# Patient Record
Sex: MALE | Race: WHITE | NOT HISPANIC OR LATINO | Employment: OTHER | URBAN - NONMETROPOLITAN AREA
[De-identification: names, ages, dates, MRNs, and addresses within clinical notes are randomized per-mention and may not be internally consistent; named-entity substitution may affect disease eponyms.]

---

## 2017-02-03 ENCOUNTER — TRANSCRIBE ORDERS (OUTPATIENT)
Dept: ADMINISTRATIVE | Facility: HOSPITAL | Age: 62
End: 2017-02-03

## 2017-02-03 DIAGNOSIS — J98.4 OTHER PULMONARY INSUFFICIENCY, NOT ELSEWHERE CLASSIFIED: Primary | ICD-10-CM

## 2017-03-31 ENCOUNTER — TRANSCRIBE ORDERS (OUTPATIENT)
Dept: ADMINISTRATIVE | Facility: HOSPITAL | Age: 62
End: 2017-03-31

## 2017-03-31 DIAGNOSIS — R27.0 ATAXIA: ICD-10-CM

## 2017-03-31 DIAGNOSIS — M51.16 LUMBAR DISC DISEASE WITH RADICULOPATHY: Primary | ICD-10-CM

## 2017-03-31 DIAGNOSIS — M54.2 NECK PAIN: ICD-10-CM

## 2017-04-05 ENCOUNTER — HOSPITAL ENCOUNTER (OUTPATIENT)
Dept: MRI IMAGING | Facility: HOSPITAL | Age: 62
Discharge: HOME | End: 2017-04-05

## 2017-04-05 ENCOUNTER — HOSPITAL ENCOUNTER (OUTPATIENT)
Dept: MRI IMAGING | Facility: HOSPITAL | Age: 62
Discharge: HOME | End: 2017-04-05
Admitting: NEUROLOGICAL SURGERY

## 2017-04-05 DIAGNOSIS — R27.0 ATAXIA: ICD-10-CM

## 2017-04-05 DIAGNOSIS — M54.2 NECK PAIN: ICD-10-CM

## 2017-04-05 DIAGNOSIS — M51.16 LUMBAR DISC DISEASE WITH RADICULOPATHY: ICD-10-CM

## 2017-04-05 LAB — CREAT BLDA-MCNC: 0.7 MG/DL (ref 0.6–1.3)

## 2017-04-05 PROCEDURE — 72141 MRI NECK SPINE W/O DYE: CPT

## 2017-04-05 PROCEDURE — 82565 ASSAY OF CREATININE: CPT

## 2017-04-05 PROCEDURE — A9577 INJ MULTIHANCE: HCPCS | Performed by: NEUROLOGICAL SURGERY

## 2017-04-05 PROCEDURE — 0 GADOBENATE DIMEGLUMINE 529 MG/ML SOLUTION: Performed by: NEUROLOGICAL SURGERY

## 2017-04-05 PROCEDURE — 72158 MRI LUMBAR SPINE W/O & W/DYE: CPT

## 2017-04-05 RX ADMIN — GADOBENATE DIMEGLUMINE 14 ML: 529 INJECTION, SOLUTION INTRAVENOUS at 10:00

## 2017-05-03 ENCOUNTER — HOSPITAL ENCOUNTER (OUTPATIENT)
Dept: CT IMAGING | Facility: HOSPITAL | Age: 62
Discharge: HOME | End: 2017-05-03
Admitting: NURSE PRACTITIONER

## 2017-05-03 ENCOUNTER — APPOINTMENT (OUTPATIENT)
Dept: CT IMAGING | Facility: HOSPITAL | Age: 62
End: 2017-05-03

## 2017-05-03 DIAGNOSIS — J98.4 OTHER PULMONARY INSUFFICIENCY, NOT ELSEWHERE CLASSIFIED: ICD-10-CM

## 2017-05-03 PROCEDURE — 71250 CT THORAX DX C-: CPT

## 2017-05-10 ENCOUNTER — APPOINTMENT (OUTPATIENT)
Dept: CT IMAGING | Facility: HOSPITAL | Age: 62
End: 2017-05-10

## 2017-09-22 ENCOUNTER — TRANSCRIBE ORDERS (OUTPATIENT)
Dept: ADMINISTRATIVE | Facility: HOSPITAL | Age: 62
End: 2017-09-22

## 2017-09-22 DIAGNOSIS — J98.4 OTHER DISORDERS OF LUNG: Primary | ICD-10-CM

## 2017-11-15 ENCOUNTER — APPOINTMENT (OUTPATIENT)
Dept: CT IMAGING | Facility: HOSPITAL | Age: 62
End: 2017-11-15

## 2017-11-15 ENCOUNTER — HOSPITAL ENCOUNTER (OUTPATIENT)
Dept: CT IMAGING | Facility: HOSPITAL | Age: 62
Discharge: HOME | End: 2017-11-15
Admitting: NURSE PRACTITIONER

## 2017-11-15 DIAGNOSIS — J98.4 OTHER DISORDERS OF LUNG: ICD-10-CM

## 2017-11-15 PROCEDURE — 71250 CT THORAX DX C-: CPT

## 2018-10-15 ENCOUNTER — TRANSCRIBE ORDERS (OUTPATIENT)
Dept: ADMINISTRATIVE | Facility: HOSPITAL | Age: 63
End: 2018-10-15

## 2018-10-15 DIAGNOSIS — J44.9 CHRONIC OBSTRUCTIVE PULMONARY DISEASE, UNSPECIFIED COPD TYPE (HCC): Primary | ICD-10-CM

## 2018-11-06 ENCOUNTER — HOSPITAL ENCOUNTER (OUTPATIENT)
Dept: GENERAL RADIOLOGY | Facility: HOSPITAL | Age: 63
Discharge: HOME | End: 2018-11-06
Admitting: NURSE PRACTITIONER

## 2018-11-06 PROCEDURE — 71046 X-RAY EXAM CHEST 2 VIEWS: CPT

## 2018-11-20 PROBLEM — M54.9 CHRONIC BACK PAIN: Status: ACTIVE | Noted: 2018-11-20

## 2018-11-20 PROBLEM — J43.2 CENTRILOBULAR EMPHYSEMA (HCC): Status: ACTIVE | Noted: 2018-11-20

## 2018-11-20 PROBLEM — J98.4 APICAL LUNG SCARRING: Status: ACTIVE | Noted: 2018-11-20

## 2018-11-20 PROBLEM — G89.29 CHRONIC BACK PAIN: Status: ACTIVE | Noted: 2018-11-20

## 2018-11-20 NOTE — PROGRESS NOTES
ELMER Parson  [facility]   Respiratory Disease Clinic  [address] [place] [address]  Phone: [phone]  Fax: [phone]     Virgil Knight is a 63 y.o. male.   CC:   Chief Complaint   Patient presents with   • Emphysema        HPI: Mr. Knight is coming in for follow-up of lung scarring and COPD.  We did follow multiple lung nodules for quite some time which ultimately developed into scarring.  He sees us on an annual basis with repeat imaging to follow those.  He utilizes Spiriva and Symbicort for maintenance therapy.  He does have chronic issues with his back and poor use of his left arm.  He indicates since I saw him last he did undergo a shoulder replacement on the left.  Weakness, pain and range of motion have significantly improved.  His overall health has been doing well.  He has not been in the hospital.  He feels that his breathing is the same on his maintenance medications.    The following portions of the patient's history were reviewed and updated as appropriate: allergies, current medications, past family history, past medical history, past social history, past surgical history and problem list.    Past Medical History:   Diagnosis Date   • Colon polyps    • Hiatal hernia    • Hypertension    • Seizure disorder (CMS/HCC)    • Stroke (CMS/HCC)        Family History   Problem Relation Age of Onset   • Colon cancer Other        Social History     Socioeconomic History   • Marital status:      Spouse name: Not on file   • Number of children: Not on file   • Years of education: Not on file   • Highest education level: Not on file   Social Needs   • Financial resource strain: Not on file   • Food insecurity - worry: Not on file   • Food insecurity - inability: Not on file   • Transportation needs - medical: Not on file   • Transportation needs - non-medical: Not on file   Occupational History   • Not on file   Tobacco Use   • Smoking status: Current Every Day  Smoker     Packs/day: 0.50     Years: 30.00     Pack years: 15.00     Types: Cigarettes     Start date: 1976   • Smokeless tobacco: Never Used   Substance and Sexual Activity   • Alcohol use: No   • Drug use: No   • Sexual activity: Not on file   Other Topics Concern   • Not on file   Social History Narrative   • Not on file       Review of Systems   Constitutional: Negative for activity change, chills, fatigue and fever.   HENT: Negative for congestion, postnasal drip, rhinorrhea, sinus pressure, sore throat and trouble swallowing.    Eyes: Negative for blurred vision, double vision and pain.   Respiratory: Positive for shortness of breath. Negative for cough, chest tightness and wheezing.    Cardiovascular: Negative for chest pain, palpitations and leg swelling.   Gastrointestinal: Negative for abdominal distention, constipation, diarrhea, nausea and vomiting.   Endocrine: Negative for polydipsia, polyphagia and polyuria.   Genitourinary: Negative for dysuria, frequency and urgency.   Musculoskeletal: Positive for joint swelling. Negative for arthralgias, back pain and gait problem.   Skin: Negative for color change, dry skin, rash and skin lesions.   Allergic/Immunologic: Negative for environmental allergies, food allergies and immunocompromised state.   Neurological: Negative for dizziness, seizures, speech difficulty, weakness, light-headedness, memory problem and confusion.   Hematological: Negative for adenopathy. Does not bruise/bleed easily.   Psychiatric/Behavioral: Negative for sleep disturbance, negative for hyperactivity and depressed mood. The patient is not nervous/anxious.        Vitals:    11/21/18 1124   BP: 132/80   Pulse: 80   Resp: 16   SpO2: 96%       Physical Exam   Constitutional: He is oriented to person, place, and time. He appears well-developed and well-nourished. No distress.   HENT:   Head: Normocephalic and atraumatic.   Right Ear: External ear normal.   Left Ear: External ear normal.    Nose: Nose normal.   Mouth/Throat: Oropharynx is clear and moist. No oropharyngeal exudate.   Eyes: Conjunctivae and EOM are normal. Pupils are equal, round, and reactive to light. Right eye exhibits no discharge. Left eye exhibits no discharge.   Neck: Normal range of motion. Neck supple. No JVD present.   Cardiovascular: Normal rate and regular rhythm.   No murmur heard.  Pulmonary/Chest: Effort normal and breath sounds normal. No respiratory distress. He has no wheezes.   Abdominal: Soft. Bowel sounds are normal. He exhibits no distension. There is no tenderness.   Musculoskeletal: Normal range of motion. He exhibits no edema or deformity.   Neurological: He is alert and oriented to person, place, and time. He displays normal reflexes. No cranial nerve deficit. Coordination normal.   Skin: Skin is warm and dry. No rash noted. He is not diaphoretic. No erythema.   Psychiatric: He has a normal mood and affect. His behavior is normal. Thought content normal.   Nursing note and vitals reviewed.      Pulmonary Functions Testing Results:    FEV1   Date Value Ref Range Status   11/21/2018 25% liters Final     FVC   Date Value Ref Range Status   11/21/2018 51% liters Final     FEV1/FVC   Date Value Ref Range Status   11/21/2018 38.52% % Final     My interpretation of his PFTs reflect very severe obstructive disease with reduced mid flows.  FEV1 is somewhat reduced from previous however the last PFT was in 2013    CXR: My interpretation from CT scan performed on 11-6-18 shows stable centrilobular emphysema, biapical scarring stable        Virgil was seen today for emphysema.    Diagnoses and all orders for this visit:    Need for immunization against influenza  -     Flucelvax Quad=>4Years (2119-0660)    COPD, group C, by GOLD 2017 classification (CMS/[facility])  -     Pulmonary Function Test; Future  -     Pulmonary Function Test  -     XR Chest 2 View; Future  -     budesonide-formoterol (SYMBICORT) 160-4.5 MCG/ACT inhaler;  Inhale 2 puffs 2 (Two) Times a Day for 14 days.  -     tiotropium bromide monohydrate (SPIRIVA RESPIMAT) 2.5 MCG/ACT aerosol solution inhaler; Inhale 2 puffs Daily for 14 days.    Apical lung scarring  -     XR Chest 2 View; Future    Centrilobular emphysema (CMS/HCC)    Chronic back pain, unspecified back location, unspecified back pain laterality      Patient's Body mass index is 20.66 kg/m². BMI is below normal. Let PCP provider.   Patient doing well from a pulmonary standpoint.  Will remain on same bronchodilators.  We will continue with annual chest x-ray imaging to survey his history of lung scarring.     Rubina Hernández, APRN  11/21/2018  12:07 PM    Return in about 1 year (around 11/21/2019) for cxr.

## 2018-11-21 ENCOUNTER — OFFICE VISIT (OUTPATIENT)
Dept: PULMONOLOGY | Facility: CLINIC | Age: 63
End: 2018-11-21

## 2018-11-21 VITALS
HEART RATE: 80 BPM | RESPIRATION RATE: 16 BRPM | HEIGHT: 70 IN | OXYGEN SATURATION: 96 % | BODY MASS INDEX: 20.62 KG/M2 | SYSTOLIC BLOOD PRESSURE: 132 MMHG | DIASTOLIC BLOOD PRESSURE: 80 MMHG | WEIGHT: 144 LBS

## 2018-11-21 DIAGNOSIS — M54.9 CHRONIC BACK PAIN, UNSPECIFIED BACK LOCATION, UNSPECIFIED BACK PAIN LATERALITY: ICD-10-CM

## 2018-11-21 DIAGNOSIS — Z23 NEED FOR IMMUNIZATION AGAINST INFLUENZA: Primary | ICD-10-CM

## 2018-11-21 DIAGNOSIS — J43.2 CENTRILOBULAR EMPHYSEMA (HCC): ICD-10-CM

## 2018-11-21 DIAGNOSIS — J98.4 APICAL LUNG SCARRING: ICD-10-CM

## 2018-11-21 DIAGNOSIS — J44.9 COPD, GROUP C, BY GOLD 2017 CLASSIFICATION (HCC): ICD-10-CM

## 2018-11-21 DIAGNOSIS — G89.29 CHRONIC BACK PAIN, UNSPECIFIED BACK LOCATION, UNSPECIFIED BACK PAIN LATERALITY: ICD-10-CM

## 2018-11-21 LAB
FEV1/FVC: NORMAL %
FEV1: NORMAL LITERS
FVC VOL RESPIRATORY: NORMAL LITERS

## 2018-11-21 PROCEDURE — 99214 OFFICE O/P EST MOD 30 MIN: CPT | Performed by: NURSE PRACTITIONER

## 2018-11-21 PROCEDURE — G0008 ADMIN INFLUENZA VIRUS VAC: HCPCS | Performed by: NURSE PRACTITIONER

## 2018-11-21 PROCEDURE — 90674 CCIIV4 VAC NO PRSV 0.5 ML IM: CPT | Performed by: NURSE PRACTITIONER

## 2018-11-21 PROCEDURE — 94010 BREATHING CAPACITY TEST: CPT | Performed by: NURSE PRACTITIONER

## 2018-11-21 RX ORDER — BUDESONIDE AND FORMOTEROL FUMARATE DIHYDRATE 160; 4.5 UG/1; UG/1
2 AEROSOL RESPIRATORY (INHALATION)
Qty: 2 INHALER | Refills: 0 | COMMUNITY
Start: 2018-11-21 | End: 2018-12-05

## 2018-11-21 RX ORDER — BUDESONIDE AND FORMOTEROL FUMARATE DIHYDRATE 160; 4.5 UG/1; UG/1
2 AEROSOL RESPIRATORY (INHALATION)
COMMUNITY
End: 2019-01-08 | Stop reason: SDUPTHER

## 2019-01-08 RX ORDER — BUDESONIDE AND FORMOTEROL FUMARATE DIHYDRATE 160; 4.5 UG/1; UG/1
AEROSOL RESPIRATORY (INHALATION)
Qty: 1 INHALER | Refills: 11 | Status: SHIPPED | OUTPATIENT
Start: 2019-01-08 | End: 2019-02-20 | Stop reason: SDUPTHER

## 2019-02-20 RX ORDER — BUDESONIDE AND FORMOTEROL FUMARATE DIHYDRATE 160; 4.5 UG/1; UG/1
2 AEROSOL RESPIRATORY (INHALATION) 2 TIMES DAILY
Qty: 1 INHALER | Refills: 0 | COMMUNITY
Start: 2019-02-20 | End: 2020-04-13

## 2019-11-14 PROBLEM — Z87.891 PERSONAL HISTORY OF NICOTINE DEPENDENCE: Status: ACTIVE | Noted: 2019-11-14

## 2019-11-14 PROBLEM — J44.9 COPD, GROUP C, BY GOLD 2017 CLASSIFICATION: Status: ACTIVE | Noted: 2019-11-14

## 2019-11-14 NOTE — PROGRESS NOTES
ELMER Parson  [facility]   Respiratory Disease Clinic  [address] [place] [address]  Phone: [phone]  Fax: [phone]     Virgil Knight is a 64 y.o. male.   CC:   Chief Complaint   Patient presents with   • follow up from chest xray     wants samples, and asking about when he needs to get a pneumonia shot        HPI: Mr. Knight is coming in for management of his lung scarring and COPD.  He experiences moderate persistent shortness of breath occurring in the chest daily for several years.  It is worsened by activity and managed very well with bronchodilators.  He is on Spiriva and Symbicort daily.  He does not require supplemental oxygen.  He has no new complaints since I saw him.  He is requesting a flu shot today.  He is also inquiring about the status of his current see with his pneumonia vaccinations.    The following portions of the patient's history were reviewed and updated as appropriate: allergies, current medications, past family history, past medical history, past social history, past surgical history and problem list.    Past Medical History:   Diagnosis Date   • Colon polyps    • COPD, group C, by GOLD 2017 classification (CMS/HCC) 11/14/2019   • Hiatal hernia    • Hypertension    • Personal history of nicotine dependence 11/14/2019   • Seizure disorder (CMS/[facility])    • Stroke (CMS/[facility])        Prior to Admission medications    Medication Sig Start Date End Date Taking? Authorizing Provider   budesonide-formoterol (SYMBICORT) 160-4.5 MCG/ACT inhaler Inhale 2 puffs 2 (Two) Times a Day. 2/20/19   Rubina Hernández APRN   Cholecalciferol (VITAMIN D3) 34870 UNITS tablet Take  by mouth.    Provider, MD Ava   citalopram (CeleXA) 20 MG tablet Take 20 mg by mouth daily.    Provider, MD Ava   clopidogrel (PLAVIX) 75 MG tablet Take 75 mg by mouth daily.    ProviderAva MD   divalproex (DEPAKOTE) 500 MG DR tablet Take 500 mg by mouth daily.     ProviderAva MD   lamoTRIgine (LaMICtal) 100 MG tablet Take 200 mg by mouth daily.    Ava Floyd MD   lisinopril (PRINIVIL,ZESTRIL) 10 MG tablet Take 10 mg by mouth daily.    Ava Floyd MD   metoprolol tartrate (LOPRESSOR) 25 MG tablet Take 25 mg by mouth daily. 1/2    Ava Floyd MD   pantoprazole (PROTONIX) 40 MG EC tablet Take 40 mg by mouth daily.    Ava Floyd MD   Simethicone 80 MG tablet Take  by mouth daily.    Ava Floyd MD   tiotropium bromide monohydrate (SPIRIVA RESPIMAT) 2.5 MCG/ACT aerosol solution inhaler Inhale 2 puffs Daily. 2/20/19   Rubina Hernández APRN       Family History   Problem Relation Age of Onset   • Colon cancer Other        Social History     Socioeconomic History   • Marital status:      Spouse name: Not on file   • Number of children: Not on file   • Years of education: Not on file   • Highest education level: Not on file   Tobacco Use   • Smoking status: Current Every Day Smoker     Packs/day: 0.50     Years: 30.00     Pack years: 15.00     Types: Cigarettes     Start date: 1976   • Smokeless tobacco: Never Used   Substance and Sexual Activity   • Alcohol use: No   • Drug use: No       Review of Systems   Constitutional: Negative for activity change, chills, fatigue and fever.   HENT: Negative for congestion, postnasal drip, rhinorrhea, sinus pressure, sore throat and trouble swallowing.    Eyes: Negative for blurred vision, double vision and pain.   Respiratory: Positive for shortness of breath. Negative for cough, chest tightness and wheezing.    Cardiovascular: Negative for chest pain, palpitations and leg swelling.   Gastrointestinal: Negative for abdominal distention, constipation, diarrhea, nausea and vomiting.   Endocrine: Negative for polydipsia, polyphagia and polyuria.   Genitourinary: Negative for dysuria, frequency and urgency.   Musculoskeletal: Positive for back pain and gait problem. Negative for  "arthralgias and joint swelling.   Skin: Negative for color change, dry skin, rash and skin lesions.   Allergic/Immunologic: Negative for environmental allergies, food allergies and immunocompromised state.   Neurological: Positive for numbness. Negative for dizziness, seizures, speech difficulty, weakness, light-headedness, memory problem and confusion.   Hematological: Negative for adenopathy. Does not bruise/bleed easily.   Psychiatric/Behavioral: Negative for sleep disturbance, negative for hyperactivity and depressed mood. The patient is not nervous/anxious.        /80   Pulse 55   Ht 177.8 cm (70\")   Wt 64 kg (141 lb)   SpO2 98% Comment: RA  BMI 20.23 kg/m²     Physical Exam   Constitutional: He is oriented to person, place, and time. He appears well-developed and well-nourished. He appears cachectic. No distress.   HENT:   Head: Normocephalic and atraumatic.   Right Ear: External ear normal.   Left Ear: External ear normal.   Nose: Nose normal.   Mouth/Throat: Oropharynx is clear and moist. No oropharyngeal exudate.   Eyes: Conjunctivae and EOM are normal. Pupils are equal, round, and reactive to light. Right eye exhibits no discharge. Left eye exhibits no discharge.   Neck: Normal range of motion. Neck supple. No JVD present.   Cardiovascular: Normal rate and regular rhythm.   No murmur heard.  Pulmonary/Chest: Effort normal and breath sounds normal. No respiratory distress. He has no wheezes.   Abdominal: Soft. Bowel sounds are normal. He exhibits no distension. There is no tenderness.   Musculoskeletal: Normal range of motion. He exhibits no edema or deformity.   Neurological: He is alert and oriented to person, place, and time. He displays normal reflexes. No cranial nerve deficit. Coordination normal.   Shuffling gait, chronic   Skin: Skin is warm and dry. No rash noted. He is not diaphoretic. No erythema.   Psychiatric: He has a normal mood and affect. His behavior is normal. Thought content " normal.   Nursing note and vitals reviewed.      Pulmonary Functions Testing Results:    FEV1   Date Value Ref Range Status   11/21/2018 25% liters Final     Results for orders placed in visit on 11/21/18   Pulmonary Function Test     No notes on file    No PFTs for this visit.  Last FEV1 on file from 1 year ago was 25    CXR: My interpretation of chest x-[facility] at [facility] from this morning shows moderate biapical emphysematous changes otherwise nothing acute        Problem List Items Addressed This Visit        Respiratory    Apical lung scarring - Primary    Relevant Medications    Aclidinium Br-Formoterol Fum 400-12 MCG/ACT aerosol powder     Other Relevant Orders    XR Chest 2 View    Centrilobular emphysema (CMS/[facility])    Relevant Medications    Aclidinium Br-Formoterol Fum 400-12 MCG/ACT aerosol powder     COPD, group C, by GOLD 2017 classification (CMS/[facility])    Relevant Medications    Aclidinium Br-Formoterol Fum 400-12 MCG/ACT aerosol powder     Other Relevant Orders    XR Chest 2 View       Other    Personal history of nicotine dependence      Other Visit Diagnoses     Encounter for immunization        Need for immunization against influenza        Relevant Orders    Fluarix/Fluzone/Afluria Quad>6 Months (Completed)        Patient's Body mass index is 20.23 kg/m². BMI is above normal parameters. Recommendations include: educational material.      Assessment/Plan         He is stable from pulmonary standpoint.  Chest x-ray remained stable.  Flu shot was provided to him today.  He has had 1 of his Pneumovax shots.  He will need to other after the age of 66.  He is due for Prevnar 13 which we do not have available here.  He plans to obtain that at his [facility] pharmacy.  His primary care provider has left the area.  He will need to establish with a new primary care provider.  Requesting annual follow-up.  He will call sooner if needed.    Rubina Hernández, APRN  11/20/2019  1:41 PM    Return in about 1 year (around  11/20/2020).

## 2019-11-20 ENCOUNTER — HOSPITAL ENCOUNTER (OUTPATIENT)
Dept: GENERAL RADIOLOGY | Facility: HOSPITAL | Age: 64
Discharge: HOME | End: 2019-11-20
Admitting: NURSE PRACTITIONER

## 2019-11-20 ENCOUNTER — OFFICE VISIT (OUTPATIENT)
Dept: PULMONOLOGY | Facility: CLINIC | Age: 64
End: 2019-11-20

## 2019-11-20 VITALS
OXYGEN SATURATION: 98 % | HEIGHT: 70 IN | HEART RATE: 55 BPM | SYSTOLIC BLOOD PRESSURE: 150 MMHG | DIASTOLIC BLOOD PRESSURE: 80 MMHG | BODY MASS INDEX: 20.19 KG/M2 | WEIGHT: 141 LBS

## 2019-11-20 DIAGNOSIS — Z87.891 PERSONAL HISTORY OF NICOTINE DEPENDENCE: ICD-10-CM

## 2019-11-20 DIAGNOSIS — J98.4 APICAL LUNG SCARRING: ICD-10-CM

## 2019-11-20 DIAGNOSIS — Z23 NEED FOR IMMUNIZATION AGAINST INFLUENZA: ICD-10-CM

## 2019-11-20 DIAGNOSIS — J44.9 COPD, GROUP C, BY GOLD 2017 CLASSIFICATION (HCC): ICD-10-CM

## 2019-11-20 DIAGNOSIS — J98.4 APICAL LUNG SCARRING: Primary | ICD-10-CM

## 2019-11-20 DIAGNOSIS — Z23 ENCOUNTER FOR IMMUNIZATION: ICD-10-CM

## 2019-11-20 DIAGNOSIS — J43.2 CENTRILOBULAR EMPHYSEMA (HCC): ICD-10-CM

## 2019-11-20 PROCEDURE — 90686 IIV4 VACC NO PRSV 0.5 ML IM: CPT | Performed by: NURSE PRACTITIONER

## 2019-11-20 PROCEDURE — 99214 OFFICE O/P EST MOD 30 MIN: CPT | Performed by: NURSE PRACTITIONER

## 2019-11-20 PROCEDURE — G0008 ADMIN INFLUENZA VIRUS VAC: HCPCS | Performed by: NURSE PRACTITIONER

## 2019-11-20 PROCEDURE — 71046 X-RAY EXAM CHEST 2 VIEWS: CPT

## 2019-11-20 RX ORDER — ASPIRIN/CALCIUM/MAG/ALUMINUM 325 MG
325 TABLET ORAL
COMMUNITY
Start: 2019-06-08

## 2019-11-20 RX ORDER — AMLODIPINE BESYLATE 10 MG/1
10 TABLET ORAL
COMMUNITY

## 2019-12-26 ENCOUNTER — TELEPHONE (OUTPATIENT)
Dept: PULMONOLOGY | Facility: CLINIC | Age: 64
End: 2019-12-26

## 2019-12-26 NOTE — TELEPHONE ENCOUNTER
He does not think the new inhaler worked good but the symbicort does well for him.   He wants to know if he can go back on spiriva and symbicort or can he take the new inhaler and symbicort together.

## 2020-04-13 RX ORDER — BUDESONIDE AND FORMOTEROL FUMARATE DIHYDRATE 160; 4.5 UG/1; UG/1
AEROSOL RESPIRATORY (INHALATION)
Qty: 33 G | Refills: 3 | Status: SHIPPED | OUTPATIENT
Start: 2020-04-13 | End: 2020-11-25

## 2020-05-14 ENCOUNTER — OFFICE VISIT (OUTPATIENT)
Dept: GASTROENTEROLOGY | Facility: CLINIC | Age: 65
End: 2020-05-14

## 2020-05-14 VITALS
HEART RATE: 80 BPM | BODY MASS INDEX: 19.32 KG/M2 | TEMPERATURE: 92.3 F | HEIGHT: 71 IN | DIASTOLIC BLOOD PRESSURE: 68 MMHG | WEIGHT: 138 LBS | SYSTOLIC BLOOD PRESSURE: 118 MMHG | RESPIRATION RATE: 16 BRPM

## 2020-05-14 DIAGNOSIS — R10.13 ABDOMINAL PAIN, EPIGASTRIC: Primary | ICD-10-CM

## 2020-05-14 PROCEDURE — 99213 OFFICE O/P EST LOW 20 MIN: CPT | Performed by: CLINICAL NURSE SPECIALIST

## 2020-05-14 NOTE — PROGRESS NOTES
Virgil Knight  1955 5/14/2020  Chief Complaint   Patient presents with   • GI Problem     Abdominal pain and bloating     Subjective   HPI  Virgil Knight is a 65 y.o. male who presents with a complaint of epigastric abdominal pain over the past month that is constant. He says that his stomach never feels right. He has fullness and bloating associated. It radiates through to his back. He describes it as a pressure. He rates it a 7 out of 10. No fever or chills.  Eating does bother it and worse after meals. He does still have his gallbladder. He has a good appetite. He has only lost a few pounds since last year. No melena. No BRBPR. No fever chills or sweats. He has had a colonoscopy in 2016 reviewed today.  He has a hx of COPD. He is fatigued and overall does not feel well.   Past Medical History:   Diagnosis Date   • Colon polyps    • COPD, group C, by GOLD 2017 classification (CMS/[facility]) 11/14/2019   • Hiatal hernia    • Hypertension    • Personal history of nicotine dependence 11/14/2019   • Seizure disorder (CMS/[facility])    • Stroke (CMS/[facility])      Past Surgical History:   Procedure Laterality Date   • COLONOSCOPY  04/22/2011    ADENOMATOUS/HYPERPLASTIC POLYP   • COLONOSCOPY N/A 12/12/2016    Hyperplastic polyp ascending colon, Diverticulosis repeat exam in 5 years   • ENDOSCOPY  09/05/2014       • HIP FRACTURE SURGERY         Outpatient Medications Marked as Taking for the 5/14/20 encounter (Office Visit) with Annie Klein APRN   Medication Sig Dispense Refill   • amLODIPine (NORVASC) 10 MG tablet Take 10 mg by mouth.     • Aspirin Buf,[id number]-[id number]-FmJcv, (ASCRIPTIN) 325 MG tablet Take 325 mg by mouth.     • budesonide-formoterol (SYMBICORT) 160-4.5 MCG/ACT inhaler Inhale 2 puffs by mouth twice daily 33 g 3   • citalopram (CeleXA) 20 MG tablet Take 20 mg by mouth daily.     • divalproex (DEPAKOTE) 500 MG DR tablet Take 500 mg by mouth daily.     • lamoTRIgine (LaMICtal) 100 MG tablet Take 200  mg by mouth daily.     • lisinopril (PRINIVIL,ZESTRIL) 10 MG tablet Take 10 mg by mouth daily.     • metoprolol tartrate (LOPRESSOR) 25 MG tablet Take 25 mg by mouth daily. 1/2     • pantoprazole (PROTONIX) 40 MG EC tablet Take 40 mg by mouth daily.     • Simethicone 80 MG tablet Take  by mouth daily.     • tiotropium bromide monohydrate (Spiriva Respimat) 2.5 MCG/ACT aerosol solution inhaler Inhale 2 puffs Daily. 1 inhaler 11     Allergies   Allergen Reactions   • Amoxicillin-Pot Clavulanate Rash   • Codeine Nausea And Vomiting     Social History     Socioeconomic History   • Marital status:      Spouse name: Not on file   • Number of children: Not on file   • Years of education: Not on file   • Highest education level: Not on file   Tobacco Use   • Smoking status: Current Every Day Smoker     Packs/day: 0.50     Years: 30.00     Pack years: 15.00     Types: Cigarettes     Start date: 1976   • Smokeless tobacco: Never Used   Substance and Sexual Activity   • Alcohol use: No   • Drug use: No     Family History   Problem Relation Age of Onset   • Colon cancer Other    • Colon polyps Other      Health Maintenance   Topic Date Due   • TDAP/TD VACCINES (1 - Tdap) 02/19/1967   • PNEUMOCOCCAL VACCINE (19-64 MEDIUM RISK) (1 of 1 - PPSV23) 02/19/1975   • ZOSTER VACCINE (1 of 2) 02/19/2006   • HEPATITIS C SCREENING  11/14/2018   • MEDICARE ANNUAL WELLNESS  11/14/2018   • INFLUENZA VACCINE  08/01/2020   • COLONOSCOPY  12/12/2026     Review of Systems   Constitutional: Negative for activity change, appetite change, chills, diaphoresis, fatigue, fever and unexpected weight change.   HENT: Negative for ear pain, hearing loss, mouth sores, sore throat, trouble swallowing and voice change.    Eyes: Negative.    Respiratory: Negative for cough, choking, shortness of breath and wheezing.    Cardiovascular: Negative for chest pain and palpitations.   Gastrointestinal: Positive for abdominal pain. Negative for blood in stool,  "constipation, diarrhea, nausea and vomiting.   Endocrine: Negative for cold intolerance and heat intolerance.   Genitourinary: Negative for decreased urine volume, dysuria, frequency, hematuria and urgency.   Musculoskeletal: Negative for back pain, gait problem and myalgias.   Skin: Negative for color change, pallor and rash.   Allergic/Immunologic: Negative for food allergies and immunocompromised state.   Neurological: Negative for dizziness, tremors, seizures, syncope, weakness, light-headedness, numbness and headaches.   Hematological: Negative for adenopathy. Does not bruise/bleed easily.   Psychiatric/Behavioral: Negative for agitation and confusion. The patient is not nervous/anxious.    All other systems reviewed and are negative.    Objective   Vitals:    05/14/20 1406   BP: 118/68   Pulse: 80   Resp: 16   Temp: 92.3 °F (33.5 °C)   Weight: 62.6 kg (138 lb)   Height: 180.3 cm (71\")     Body mass index is 19.25 kg/m².  Physical Exam   Constitutional: He is oriented to person, place, and time. He appears well-developed and well-nourished.   Thin appearing    HENT:   Head: Normocephalic and atraumatic.   Eyes: Pupils are equal, round, and reactive to light.   Neck: Normal range of motion. Neck supple. No tracheal deviation present.   Cardiovascular: Normal rate, regular rhythm and normal heart sounds. Exam reveals no gallop and no friction rub.   No murmur heard.  Pulmonary/Chest: Effort normal and breath sounds normal. No respiratory distress. He has no wheezes. He has no rales. He exhibits no tenderness.   Abdominal: Soft. Bowel sounds are normal. He exhibits no distension. There is no hepatosplenomegaly. There is tenderness. There is no rigidity, no rebound and no guarding.   Musculoskeletal: Normal range of motion. He exhibits no edema, tenderness or deformity.   Neurological: He is alert and oriented to person, place, and time. He has normal reflexes.   Skin: Skin is warm and dry. No rash noted. No " pallor.   Psychiatric: He has a normal mood and affect. His behavior is normal. Judgment and thought content normal.     Assessment/Plan   Virgil was seen today for gi problem.    Diagnoses and all orders for this visit:    Abdominal pain, epigastric  -     CT Abdomen Pelvis With Contrast; Future        Part of this note may be an electronic transcription/translation of spoken language to printed text using the Dragon Dictation System.  Body mass index is 19.25 kg/m².  Return in about 2 weeks (around 5/28/2020).    Patient's Body mass index is 19.25 kg/m². BMI is within normal parameters. No follow-up required..      All risks, benefits, alternatives, and indications of colonoscopy and/or Endoscopy procedure have been discussed with the patient. Risks to include perforation of the colon requiring possible surgery or colostomy, risk of bleeding from biopsies or removal of colon tissue, possibility of missing a colon polyp or cancer, or adverse drug reaction.  Benefits to include the diagnosis and management of disease of the colon and rectum. Alternatives to include barium enema, radiographic evaluation, lab testing or no intervention. Pt verbalizes understanding and agrees.     Annie Klein, APRN  5/14/2020  14:34      Obesity, Adult  Obesity is the condition of having too much total body fat. Being overweight or obese means that your weight is greater than what is considered healthy for your body size. Obesity is determined by a measurement called BMI. BMI is an estimate of body fat and is calculated from height and weight. For adults, a BMI of 30 or higher is considered obese.  Obesity can eventually lead to other health concerns and major illnesses, including:  · Stroke.  · Coronary artery disease (CAD).  · Type 2 diabetes.  · Some types of cancer, including cancers of the colon, breast, uterus, and gallbladder.  · Osteoarthritis.  · High blood pressure (hypertension).  · High cholesterol.  · Sleep  apnea.  · Gallbladder stones.  · Infertility problems.  What are the causes?  The main cause of obesity is taking in (consuming) more calories than your body uses for energy. Other factors that contribute to this condition may include:  · Being born with genes that make you more likely to become obese.  · Having a medical condition that causes obesity. These conditions include:  ¨ Hypothyroidism.  ¨ Polycystic ovarian syndrome (PCOS).  ¨ Binge-eating disorder.  ¨ Cushing syndrome.  · Taking certain medicines, such as steroids, antidepressants, and seizure medicines.  · Not being physically active (sedentary lifestyle).  · Living where there are limited places to exercise safely or buy healthy foods.  · Not getting enough sleep.  What increases the risk?  The following factors may increase your risk of this condition:  · Having a family history of obesity.  · Being a woman of -American descent.  · Being a man of  descent.  What are the signs or symptoms?  Having excessive body fat is the main symptom of this condition.  How is this diagnosed?  This condition may be diagnosed based on:  · Your symptoms.  · Your medical history.  · A physical exam. Your health care provider may measure:  ¨ Your BMI. If you are an adult with a BMI between 25 and less than 30, you are considered overweight. If you are an adult with a BMI of 30 or higher, you are considered obese.  ¨ The distances around your hips and your waist (circumferences). These may be compared to each other to help diagnose your condition.  ¨ Your skinfold thickness. Your health care provider may gently pinch a fold of your skin and measure it.  How is this treated?  Treatment for this condition often includes changing your lifestyle. Treatment may include some or all of the following:  · Dietary changes. Work with your health care provider and a dietitian to set a weight-loss goal that is healthy and reasonable for you. Dietary changes may include  eating:  ¨ Smaller portions. A portion size is the amount of a particular food that is healthy for you to eat at one time. This varies from person to person.  ¨ Low-calorie or low-fat options.  ¨ More whole grains, fruits, and vegetables.  · Regular physical activity. This may include aerobic activity (cardio) and strength training.  · Medicine to help you lose weight. Your health care provider may prescribe medicine if you are unable to lose 1 pound a week after 6 weeks of eating more healthily and doing more physical activity.  · Surgery. Surgical options may include gastric banding and gastric bypass. Surgery may be done if:  ¨ Other treatments have not helped to improve your condition.  ¨ You have a BMI of 40 or higher.  ¨ You have life-threatening health problems related to obesity.  Follow these instructions at home:     Eating and drinking     · Follow recommendations from your health care provider about what you eat and drink. Your health care provider may advise you to:  ¨ Limit fast foods, sweets, and processed snack foods.  ¨ Choose low-fat options, such as low-fat milk instead of whole milk.  ¨ Eat 5 or more servings of fruits or vegetables every day.  ¨ Eat at home more often. This gives you more control over what you eat.  ¨ Choose healthy foods when you eat out.  ¨ Learn what a healthy portion size is.  ¨ Keep low-fat snacks on hand.  ¨ Avoid sugary drinks, such as soda, fruit juice, iced tea sweetened with sugar, and flavored milk.  ¨ Eat a healthy breakfast.  · Drink enough water to keep your urine clear or pale yellow.  · Do not go without eating for long periods of time (do not fast) or follow a fad diet. Fasting and fad diets can be unhealthy and even dangerous.  Physical Activity   · Exercise regularly, as told by your health care provider. Ask your health care provider what types of exercise are safe for you and how often you should exercise.  · Warm up and stretch before being active.  · Cool  down and stretch after being active.  · Rest between periods of activity.  Lifestyle   · Limit the time that you spend in front of your TV, computer, or video game system.  · Find ways to reward yourself that do not involve food.  · Limit alcohol intake to no more than 1 drink a day for nonpregnant women and 2 drinks a day for men. One drink equals 12 oz of beer, 5 oz of wine, or 1½ oz of hard liquor.  General instructions   · Keep a weight loss journal to keep track of the food you eat and how much you exercise you get.  · Take over-the-counter and prescription medicines only as told by your health care provider.  · Take vitamins and supplements only as told by your health care provider.  · Consider joining a support group. Your health care provider may be able to recommend a support group.  · Keep all follow-up visits as told by your health care provider. This is important.  Contact a health care provider if:  · You are unable to meet your weight loss goal after 6 weeks of dietary and lifestyle changes.  This information is not intended to replace advice given to you by your health care provider. Make sure you discuss any questions you have with your health care provider.  Document Released: 01/25/2006 Document Revised: 05/22/2017 Document Reviewed: 10/05/2016  [employer] Interactive Patient Education © 2017 [employer]      If you smoke or use tobacco, 4 minutes reading provided  Steps to Quit Smoking  Smoking tobacco can be harmful to your health and can affect almost every organ in your body. Smoking puts you, and those around you, at risk for developing many serious chronic diseases. Quitting smoking is difficult, but it is one of the best things that you can do for your health. It is never too late to quit.  What are the benefits of quitting smoking?  When you quit smoking, you lower your risk of developing serious diseases and conditions, such as:  · Lung cancer or lung disease, such as COPD.  · Heart  disease.  · Stroke.  · Heart attack.  · Infertility.  · Osteoporosis and bone fractures.  Additionally, symptoms such as coughing, wheezing, and shortness of breath may get better when you quit. You may also find that you get sick less often because your body is stronger at fighting off colds and infections. If you are pregnant, quitting smoking can help to reduce your chances of having a baby of low birth weight.  How do I get ready to quit?  When you decide to quit smoking, create a plan to make sure that you are successful. Before you quit:  · Pick a date to quit. Set a date within the next two weeks to give you time to prepare.  · Write down the reasons why you are quitting. Keep this list in places where you will see it often, such as on your bathroom mirror or in your car or wallet.  · Identify the people, places, things, and activities that make you want to smoke (triggers) and avoid them. Make sure to take these actions:  ¨ Throw away all cigarettes at home, at work, and in your car.  ¨ Throw away smoking accessories, such as ashtrays and lighters.  ¨ Clean your car and make sure to empty the ashtray.  ¨ Clean your home, including curtains and carpets.  · Tell your family, friends, and coworkers that you are quitting. Support from your loved ones can make quitting easier.  · Talk with your health care provider about your options for quitting smoking.  · Find out what treatment options are covered by your health insurance.  What strategies can I use to quit smoking?  Talk with your healthcare provider about different strategies to quit smoking. Some strategies include:  · Quitting smoking altogether instead of gradually lessening how much you smoke over a period of time. Research shows that quitting “cold turkey” is more successful than gradually quitting.  · Attending in-person counseling to help you build problem-solving skills. You are more likely to have success in quitting if you attend several  counseling sessions. Even short sessions of 10 minutes can be effective.  · Finding resources and support systems that can help you to quit smoking and remain smoke-free after you quit. These resources are most helpful when you use them often. They can include:  ¨ Online chats with a counselor.  ¨ Telephone quitlines.  ¨ Printed self-help materials.  ¨ Support groups or group counseling.  ¨ Text messaging programs.  ¨ Mobile phone applications.  · Taking medicines to help you quit smoking. (If you are pregnant or breastfeeding, talk with your health care provider first.) Some medicines contain nicotine and some do not. Both types of medicines help with cravings, but the medicines that include nicotine help to relieve withdrawal symptoms. Your health care provider may recommend:  ¨ Nicotine patches, gum, or lozenges.  ¨ Nicotine inhalers or sprays.  ¨ Non-nicotine medicine that is taken by mouth.  Talk with your health care provider about combining strategies, such as taking medicines while you are also receiving in-person counseling. Using these two strategies together makes you more likely to succeed in quitting than if you used either strategy on its own.  If you are pregnant or breastfeeding, talk with your health care provider about finding counseling or other support strategies to quit smoking. Do not take medicine to help you quit smoking unless told to do so by your health care provider.  What things can I do to make it easier to quit?  Quitting smoking might feel overwhelming at first, but there is a lot that you can do to make it easier. Take these important actions:  · Reach out to your family and friends and ask that they support and encourage you during this time. Call telephone quitlines, reach out to support groups, or work with a counselor for support.  · Ask people who smoke to avoid smoking around you.  · Avoid places that trigger you to smoke, such as bars, parties, or smoke-break areas at  work.  · Spend time around people who do not smoke.  · Lessen stress in your life, because stress can be a smoking trigger for some people. To lessen stress, try:  ¨ Exercising regularly.  ¨ Deep-breathing exercises.  ¨ Yoga.  ¨ Meditating.  ¨ Performing a body scan. This involves closing your eyes, scanning your body from head to toe, and noticing which parts of your body are particularly tense. Purposefully relax the muscles in those areas.  · Download or purchase mobile phone or tablet apps (applications) that can help you stick to your quit plan by providing reminders, tips, and encouragement. There are many free apps, such as QuitGuide from the CDC (Centers for Disease Control and Prevention). You can find other support for quitting smoking (smoking cessation) through smokefree.ChoicePass and other websites.  How will I feel when I quit smoking?  Within the first 24 hours of quitting smoking, you may start to feel some withdrawal symptoms. These symptoms are usually most noticeable 2-3 days after quitting, but they usually do not last beyond 2-3 weeks. Changes or symptoms that you might experience include:  · Mood swings.  · Restlessness, anxiety, or irritation.  · Difficulty concentrating.  · Dizziness.  · Strong cravings for sugary foods in addition to nicotine.  · Mild weight gain.  · Constipation.  · Nausea.  · Coughing or a sore throat.  · Changes in how your medicines work in your body.  · A depressed mood.  · Difficulty sleeping (insomnia).  After the first 2-3 weeks of quitting, you may start to notice more positive results, such as:  · Improved sense of smell and taste.  · Decreased coughing and sore throat.  · Slower heart rate.  · Lower blood pressure.  · Clearer skin.  · The ability to breathe more easily.  · Fewer sick days.  Quitting smoking is very challenging for most people. Do not get discouraged if you are not successful the first time. Some people need to make many attempts to quit before they  achieve long-term success. Do your best to stick to your quit plan, and talk with your health care provider if you have any questions or concerns.  This information is not intended to replace advice given to you by your health care provider. Make sure you discuss any questions you have with your health care provider.  Document Released: 12/12/2002 Document Revised: 08/15/2017 Document Reviewed: 05/03/2016  [employer] Interactive Patient Education © 2017 [employer]

## 2020-05-20 ENCOUNTER — HOSPITAL ENCOUNTER (OUTPATIENT)
Dept: CT IMAGING | Facility: HOSPITAL | Age: 65
Discharge: HOME | End: 2020-05-20
Admitting: CLINICAL NURSE SPECIALIST

## 2020-05-20 ENCOUNTER — APPOINTMENT (OUTPATIENT)
Dept: CT IMAGING | Facility: HOSPITAL | Age: 65
End: 2020-05-20

## 2020-05-20 DIAGNOSIS — R10.13 ABDOMINAL PAIN, EPIGASTRIC: ICD-10-CM

## 2020-05-20 LAB — CREAT BLDA-MCNC: 0.7 MG/DL (ref 0.6–1.3)

## 2020-05-20 PROCEDURE — 82565 ASSAY OF CREATININE: CPT

## 2020-05-20 PROCEDURE — 74177 CT ABD & PELVIS W/CONTRAST: CPT

## 2020-05-20 PROCEDURE — 25010000002 IOPAMIDOL 61 % SOLUTION: Performed by: CLINICAL NURSE SPECIALIST

## 2020-05-20 RX ADMIN — IOPAMIDOL 100 ML: 612 INJECTION, SOLUTION INTRAVENOUS at 11:40

## 2020-05-20 RX ADMIN — IOPAMIDOL 50 ML: 612 INJECTION, SOLUTION INTRAVENOUS at 11:40

## 2020-05-26 ENCOUNTER — TELEPHONE (OUTPATIENT)
Dept: VASCULAR SURGERY | Facility: CLINIC | Age: 65
End: 2020-05-26

## 2020-05-26 ENCOUNTER — TELEPHONE (OUTPATIENT)
Dept: PODIATRY | Facility: CLINIC | Age: 65
End: 2020-05-26

## 2020-05-26 ENCOUNTER — TELEPHONE (OUTPATIENT)
Dept: GASTROENTEROLOGY | Facility: CLINIC | Age: 65
End: 2020-05-26

## 2020-05-26 DIAGNOSIS — R10.13 ABDOMINAL PAIN, EPIGASTRIC: ICD-10-CM

## 2020-05-26 DIAGNOSIS — R93.5 ABNORMAL CT OF THE ABDOMEN: Primary | ICD-10-CM

## 2020-05-26 NOTE — TELEPHONE ENCOUNTER
Pt called requesting to be seen sooner than June. I told pt wife we would call back and f/u with her. DN

## 2020-05-26 NOTE — TELEPHONE ENCOUNTER
Spoke with patient and advised of upcoming appointment with Dr. Ott. Advised of visitation and mask policy.  Patient expressed understanding for all that was discussed.

## 2020-05-26 NOTE — TELEPHONE ENCOUNTER
Pts spouse called to see if pt needed to keep his appointment with you on 5/28 since we are scheduling an endo, liver u/s and appointment with Dr. Ott. Should we move it to after those procedures have been completed?

## 2020-05-28 PROBLEM — R10.13 ABDOMINAL PAIN, EPIGASTRIC: Status: ACTIVE | Noted: 2020-05-28

## 2020-05-29 ENCOUNTER — APPOINTMENT (OUTPATIENT)
Dept: ULTRASOUND IMAGING | Facility: HOSPITAL | Age: 65
End: 2020-05-29

## 2020-05-29 ENCOUNTER — HOSPITAL ENCOUNTER (OUTPATIENT)
Dept: ULTRASOUND IMAGING | Facility: HOSPITAL | Age: 65
Discharge: HOME | End: 2020-05-29
Admitting: CLINICAL NURSE SPECIALIST

## 2020-05-29 DIAGNOSIS — R93.5 ABNORMAL CT OF THE ABDOMEN: ICD-10-CM

## 2020-05-29 PROCEDURE — 76705 ECHO EXAM OF ABDOMEN: CPT

## 2020-06-01 ENCOUNTER — TRANSCRIBE ORDERS (OUTPATIENT)
Dept: ADMINISTRATIVE | Facility: HOSPITAL | Age: 65
End: 2020-06-01

## 2020-06-01 DIAGNOSIS — Z01.818 PRE-OP TESTING: Primary | ICD-10-CM

## 2020-06-02 ENCOUNTER — LAB (OUTPATIENT)
Dept: LAB | Facility: HOSPITAL | Age: 65
End: 2020-06-02

## 2020-06-02 PROCEDURE — U0003 INFECTIOUS AGENT DETECTION BY NUCLEIC ACID (DNA OR RNA); SEVERE ACUTE RESPIRATORY SYNDROME CORONAVIRUS 2 (SARS-COV-2) (CORONAVIRUS DISEASE [COVID-19]), AMPLIFIED PROBE TECHNIQUE, MAKING USE OF HIGH THROUGHPUT TECHNOLOGIES AS DESCRIBED BY CMS-2020-01-R: HCPCS | Performed by: INTERNAL MEDICINE

## 2020-06-03 LAB
COVID LABCORP PRIORITY: NORMAL
SARS-COV-2 RNA RESP QL NAA+PROBE: NOT DETECTED

## 2020-06-05 ENCOUNTER — ANESTHESIA (OUTPATIENT)
Dept: GASTROENTEROLOGY | Facility: HOSPITAL | Age: 65
End: 2020-06-05

## 2020-06-05 ENCOUNTER — ANESTHESIA EVENT (OUTPATIENT)
Dept: GASTROENTEROLOGY | Facility: HOSPITAL | Age: 65
End: 2020-06-05

## 2020-06-05 ENCOUNTER — HOSPITAL ENCOUNTER (OUTPATIENT)
Facility: HOSPITAL | Age: 65
Setting detail: HOSPITAL OUTPATIENT SURGERY
Discharge: HOME | End: 2020-06-05
Attending: INTERNAL MEDICINE | Admitting: INTERNAL MEDICINE

## 2020-06-05 VITALS
RESPIRATION RATE: 20 BRPM | DIASTOLIC BLOOD PRESSURE: 77 MMHG | SYSTOLIC BLOOD PRESSURE: 124 MMHG | BODY MASS INDEX: 19.04 KG/M2 | HEIGHT: 71 IN | HEART RATE: 67 BPM | OXYGEN SATURATION: 96 % | WEIGHT: 136 LBS | TEMPERATURE: 97.2 F

## 2020-06-05 DIAGNOSIS — R10.13 ABDOMINAL PAIN, EPIGASTRIC: ICD-10-CM

## 2020-06-05 PROCEDURE — 88305 TISSUE EXAM BY PATHOLOGIST: CPT | Performed by: INTERNAL MEDICINE

## 2020-06-05 PROCEDURE — 43239 EGD BIOPSY SINGLE/MULTIPLE: CPT | Performed by: INTERNAL MEDICINE

## 2020-06-05 PROCEDURE — 25010000002 PROPOFOL 10 MG/ML EMULSION: Performed by: NURSE ANESTHETIST, CERTIFIED REGISTERED

## 2020-06-05 RX ORDER — SODIUM CHLORIDE 9 MG/ML
500 INJECTION, SOLUTION INTRAVENOUS CONTINUOUS PRN
Status: DISCONTINUED | OUTPATIENT
Start: 2020-06-05 | End: 2020-06-05 | Stop reason: HOSPADM

## 2020-06-05 RX ORDER — PROPOFOL 10 MG/ML
VIAL (ML) INTRAVENOUS AS NEEDED
Status: DISCONTINUED | OUTPATIENT
Start: 2020-06-05 | End: 2020-06-05 | Stop reason: SURG

## 2020-06-05 RX ORDER — SODIUM CHLORIDE 0.9 % (FLUSH) 0.9 %
10 SYRINGE (ML) INJECTION AS NEEDED
Status: DISCONTINUED | OUTPATIENT
Start: 2020-06-05 | End: 2020-06-05 | Stop reason: HOSPADM

## 2020-06-05 RX ADMIN — SODIUM CHLORIDE 500 ML: 9 INJECTION, SOLUTION INTRAVENOUS at 08:05

## 2020-06-05 RX ADMIN — LIDOCAINE HYDROCHLORIDE 100 MG: 20 INJECTION, SOLUTION INTRAVENOUS at 09:11

## 2020-06-05 RX ADMIN — PROPOFOL 100 MG: 10 INJECTION, EMULSION INTRAVENOUS at 09:11

## 2020-06-05 RX ADMIN — PROPOFOL 50 MG: 10 INJECTION, EMULSION INTRAVENOUS at 09:16

## 2020-06-05 NOTE — ANESTHESIA PREPROCEDURE EVALUATION
Anesthesia Evaluation     Patient summary reviewed and Nursing notes reviewed   no history of anesthetic complications:  NPO Solid Status: > 8 hours  NPO Liquid Status: > 8 hours           Airway   Mallampati: II  TM distance: >3 FB  Neck ROM: full  No difficulty expected  Dental    (+) edentulous    Pulmonary    (+) a smoker Current Smoked day of surgery, COPD,   Cardiovascular   Exercise tolerance: poor (<4 METS)    (+) hypertension,   (-) CAD      Neuro/Psych  (+) seizures well controlled, CVA (right arm and leg weakness) residual symptoms,     GI/Hepatic/Renal/Endo    (+)  hiatal hernia,    (-) liver disease, no renal disease, diabetes    Musculoskeletal     Abdominal    Substance History      OB/GYN          Other                        Anesthesia Plan    ASA 3     MAC     intravenous induction     Anesthetic plan, all risks, benefits, and alternatives have been provided, discussed and informed consent has been obtained with: patient.

## 2020-06-05 NOTE — ANESTHESIA POSTPROCEDURE EVALUATION
Patient: Virgil Knight    Procedure Summary     Date:  06/05/20 Room / Location:  [facility] ENDOSCOPY 5 / [facility]    Anesthesia Start:  0908 Anesthesia Stop:  0919    Procedure:  ESOPHAGOGASTRODUODENOSCOPY WITH ANESTHESIA (N/A ) Diagnosis:       Abdominal pain, epigastric      (Abdominal pain, epigastric [R10.13])    Surgeon:  Chandra Ordaz MD Provider:  Juan Carlos Garcia CRNA    Anesthesia Type:  MAC ASA Status:  3          Anesthesia Type: MAC    Vitals  Vitals Value Taken Time   BP     Temp     Pulse 66 6/5/2020  9:22 AM   Resp     SpO2 98 % 6/5/2020  9:22 AM   Vitals shown include unvalidated device data.        Post Anesthesia Care and Evaluation    Patient location during evaluation: PHASE II  Level of consciousness: awake and alert  Pain management: adequate  Airway patency: patent  Anesthetic complications: No anesthetic complications    Cardiovascular status: acceptable  Respiratory status: acceptable  Hydration status: acceptable

## 2020-06-08 ENCOUNTER — TELEPHONE (OUTPATIENT)
Dept: VASCULAR SURGERY | Facility: CLINIC | Age: 65
End: 2020-06-08

## 2020-06-08 LAB
LAB AP CASE REPORT: NORMAL
PATH REPORT.FINAL DX SPEC: NORMAL
PATH REPORT.GROSS SPEC: NORMAL

## 2020-06-08 NOTE — TELEPHONE ENCOUNTER
Spoke with Mr Knight reminding him of his appointment for Tuesday, June 9th, 2020 at 1030 am with Dr Ott. Mr Knight confirmed he would be here.

## 2020-06-09 ENCOUNTER — OFFICE VISIT (OUTPATIENT)
Dept: VASCULAR SURGERY | Facility: CLINIC | Age: 65
End: 2020-06-09

## 2020-06-09 VITALS
HEART RATE: 58 BPM | SYSTOLIC BLOOD PRESSURE: 142 MMHG | OXYGEN SATURATION: 96 % | HEIGHT: 71 IN | BODY MASS INDEX: 19.04 KG/M2 | WEIGHT: 136 LBS | DIASTOLIC BLOOD PRESSURE: 88 MMHG

## 2020-06-09 DIAGNOSIS — I70.213 ATHEROSCLEROSIS OF NATIVE ARTERY OF BOTH LOWER EXTREMITIES WITH INTERMITTENT CLAUDICATION (HCC): ICD-10-CM

## 2020-06-09 DIAGNOSIS — Z87.891 PERSONAL HISTORY OF NICOTINE DEPENDENCE: ICD-10-CM

## 2020-06-09 DIAGNOSIS — I65.23 BILATERAL CAROTID ARTERY STENOSIS: ICD-10-CM

## 2020-06-09 DIAGNOSIS — I73.9 PAD (PERIPHERAL ARTERY DISEASE) (HCC): ICD-10-CM

## 2020-06-09 DIAGNOSIS — I10 ESSENTIAL HYPERTENSION: ICD-10-CM

## 2020-06-09 DIAGNOSIS — I77.811 AORTIC ECTASIA, ABDOMINAL (HCC): Primary | ICD-10-CM

## 2020-06-09 PROCEDURE — 99204 OFFICE O/P NEW MOD 45 MIN: CPT | Performed by: SURGERY

## 2020-06-09 NOTE — PROGRESS NOTES
06/09/2020      Annie Klein APRN  [address]  [place] [address]    Virgil Knight  1955    Chief Complaint   Patient presents with   • Establish Care     Referred over by Annei PAYNE for  Abnormal CT of the abdomen. Patient denies any stroke like symptoms.        Dear Annie Klein, AP*    HPI  I had the pleasure of seeing your patient Virgil Knight in the office today.  Thank you kindly for this consultation.  As you recall, Virgil Knight is a 65 y.o.  male who you are currently following for bloating and gas.  He reports this is causing shortness of breath at times.  He was undergoing workup and found to have ectasia of aorta and iliac arteries with plaque.  She has a history of stroke and chronic numbness to his right arm.  He has previous back surgery.  He does have complaints of pain while walking.  Unfortunately, he is a smoker.  He is maintained on full dose aspirin.  He did have noninvasive testing performed, which I did personally review.             Past Medical History:   Diagnosis Date   • Colon polyps    • COPD, group C, by GOLD 2017 classification (CMS/HCC) 11/14/2019   • Hiatal hernia    • Hypertension    • Personal history of nicotine dependence 11/14/2019   • Seizure disorder (CMS/HCC)    • Stroke (CMS/HCC)        Past Surgical History:   Procedure Laterality Date   • COLONOSCOPY  04/22/2011    ADENOMATOUS/HYPERPLASTIC POLYP   • COLONOSCOPY N/A 12/12/2016    Hyperplastic polyp ascending colon, Diverticulosis repeat exam in 5 years   • ENDOSCOPY  09/05/2014       • ENDOSCOPY N/A 6/5/2020    Procedure: ESOPHAGOGASTRODUODENOSCOPY WITH ANESTHESIA;  Surgeon: Chandra Ordaz MD;  Location: [facility] ENDOSCOPY;  Service: Gastroenterology;  Laterality: N/A;  preop; abdominal pain  postop; normal   PCP none    • HIP FRACTURE SURGERY     • SHOULDER SURGERY Bilateral    • WRIST SURGERY Right 2013       Family History   Problem Relation Age of Onset   •  Colon cancer Other    • Colon polyps Other        Social History     Socioeconomic History   • Marital status:      Spouse name: Not on file   • Number of children: Not on file   • Years of education: Not on file   • Highest education level: Not on file   Tobacco Use   • Smoking status: Current Every Day Smoker     Packs/day: 0.50     Years: 30.00     Pack years: 15.00     Types: Cigarettes     Start date: 1976   • Smokeless tobacco: Never Used   Substance and Sexual Activity   • Alcohol use: No   • Drug use: No   • Sexual activity: Defer       Allergies   Allergen Reactions   • Amoxicillin-Pot Clavulanate Rash   • Codeine Nausea And Vomiting         Current Outpatient Medications:   •  amLODIPine (NORVASC) 10 MG tablet, Take 10 mg by mouth., Disp: , Rfl:   •  Aspirin Buf,SwTaa-DuVpt-YnZoi, (ASCRIPTIN) 325 MG tablet, Take 325 mg by mouth., Disp: , Rfl:   •  budesonide-formoterol (SYMBICORT) 160-4.5 MCG/ACT inhaler, Inhale 2 puffs by mouth twice daily, Disp: 33 g, Rfl: 3  •  citalopram (CeleXA) 20 MG tablet, Take 20 mg by mouth daily., Disp: , Rfl:   •  divalproex (DEPAKOTE) 500 MG DR tablet, Take 500 mg by mouth daily., Disp: , Rfl:   •  lamoTRIgine (LaMICtal) 200 MG tablet, Take 200 mg by mouth Daily., Disp: , Rfl:   •  lisinopril (PRINIVIL,ZESTRIL) 40 MG tablet, Take 40 mg by mouth Daily., Disp: , Rfl:   •  metoprolol tartrate (LOPRESSOR) 25 MG tablet, Take 25 mg by mouth daily. 1/2, Disp: , Rfl:   •  pantoprazole (PROTONIX) 40 MG EC tablet, Take 40 mg by mouth daily., Disp: , Rfl:   •  tiotropium bromide monohydrate (Spiriva Respimat) 2.5 MCG/ACT aerosol solution inhaler, Inhale 2 puffs Daily., Disp: 1 inhaler, Rfl: 11    Review of Systems   Constitutional: Negative.    HENT: Negative.    Eyes: Negative.    Respiratory: Positive for shortness of breath.    Cardiovascular: Negative.    Gastrointestinal: Negative.    Endocrine: Negative.    Genitourinary: Negative.    Musculoskeletal: Positive for back pain  "and gait problem.   Skin: Negative.    Allergic/Immunologic: Negative.    Neurological: Positive for numbness.   Hematological: Negative.    Psychiatric/Behavioral: Negative.    All other systems reviewed and are negative.    /88 (BP Location: Left arm, Patient Position: Sitting, Cuff Size: Adult)   Pulse 58   Ht 180.3 cm (71\")   Wt 61.7 kg (136 lb)   SpO2 96%   BMI 18.97 kg/m²     Physical Exam   Constitutional: He is oriented to person, place, and time. He appears well-developed and well-nourished.   HENT:   Head: Normocephalic and atraumatic.   Eyes: Pupils are equal, round, and reactive to light. No scleral icterus.   Neck: Neck supple. No JVD present. Carotid bruit is not present. No thyromegaly present.   Cardiovascular: Normal rate, regular rhythm and normal heart sounds.   Pulses:       Carotid pulses are 2+ on the right side, and 2+ on the left side.       Femoral pulses are 2+ on the right side, and 2+ on the left side.  Pulmonary/Chest: Effort normal and breath sounds normal.   Abdominal: Soft. Bowel sounds are normal. He exhibits no distension, no abdominal bruit and no mass. There is no hepatosplenomegaly. There is no tenderness.   Musculoskeletal: Normal range of motion. He exhibits no edema.   Right foot turns in due to stroke   Lymphadenopathy:     He has no cervical adenopathy.   Neurological: He is alert and oriented to person, place, and time. He has normal strength. No cranial nerve deficit or sensory deficit.   Skin: Skin is warm, dry and intact.   Psychiatric: He has a normal mood and affect. His behavior is normal. Judgment and thought content normal.   Nursing note and vitals reviewed.      Patient Active Problem List   Diagnosis   • History of colon polyps   • Platelet inhibition due to Plavix   • Apical lung scarring   • Chronic back pain   • Centrilobular emphysema (CMS/HCC)   • Personal history of nicotine dependence   • COPD, group C, by GOLD 2017 classification (CMS/HCC)   • " Abdominal pain, epigastric   • Hypertension        Diagnosis Plan   1. Aortic ectasia, abdominal (CMS/HCC)     2. Bilateral carotid artery stenosis  US Carotid Bilateral   3. PAD (peripheral artery disease) (CMS/HCC)  US Ankle / Brachial Indices Extremity Complete   4. Personal history of nicotine dependence     5. Essential hypertension     6. Atherosclerosis of native artery of both lower extremities with intermittent claudication (CMS/HCC)         Plan: After thoroughly evaluating Virgil Knight, I believe the best course of action is to remain conservative for vascular surgery standpoint.  I did carefully review the CTA of the abdomen and pelvis that does show significant clot burden in the aortic wall with ectasia present.  This requires surveillance only at this time.  He has complaints of lower extremity claudication as well.  I will send him for some noninvasive testing which will include a carotid duplex and an TYE.  He does have evidence of significant vascular disease and has had a previous history of stroke. I did discuss vascular risk factors as they pertain to the progression of vascular disease including controlling hypertension and tobacco abuse.  Currently, the hypertension is under control.  Tobacco abuse counseling was discussed for over 10 minutes. The patient is to continue taking their medications as previously discussed.   This was all discussed in full with complete understanding.  Thank you for allowing me to participate in the care of your patient.  Please do not hesitate to call with any questions or concerns.  We will keep you aware of any further encounters with Virgil Knight.        Sincerely yours,         Nikhil Ott,     Provider, No Known

## 2020-06-25 ENCOUNTER — TELEPHONE (OUTPATIENT)
Dept: VASCULAR SURGERY | Facility: CLINIC | Age: 65
End: 2020-06-25

## 2020-06-25 NOTE — TELEPHONE ENCOUNTER
Spoke to pt about rescheduled appt with Dr. Ott at 915am and testing at heart center at 630am on 6/30. Pt expressed understanding.

## 2020-06-29 ENCOUNTER — TELEPHONE (OUTPATIENT)
Dept: VASCULAR SURGERY | Facility: CLINIC | Age: 65
End: 2020-06-29

## 2020-06-29 NOTE — TELEPHONE ENCOUNTER
Spoke with Mr Knight reminding him of his appointments for Tuesday, June 30th, 2020. Reminded [facility] to [facility] at 630 am for testing and follow up afterwards at 915 am with Dr Ott. Mr Knight confirmed he would be here.

## 2020-06-30 ENCOUNTER — HOSPITAL ENCOUNTER (OUTPATIENT)
Dept: ULTRASOUND IMAGING | Facility: HOSPITAL | Age: 65
Discharge: HOME | End: 2020-06-30
Admitting: SURGERY

## 2020-06-30 ENCOUNTER — HOSPITAL ENCOUNTER (OUTPATIENT)
Dept: ULTRASOUND IMAGING | Facility: HOSPITAL | Age: 65
Discharge: HOME | End: 2020-06-30

## 2020-06-30 ENCOUNTER — OFFICE VISIT (OUTPATIENT)
Dept: VASCULAR SURGERY | Facility: CLINIC | Age: 65
End: 2020-06-30

## 2020-06-30 VITALS
DIASTOLIC BLOOD PRESSURE: 78 MMHG | BODY MASS INDEX: 18.62 KG/M2 | SYSTOLIC BLOOD PRESSURE: 136 MMHG | WEIGHT: 133 LBS | HEIGHT: 71 IN | HEART RATE: 60 BPM | OXYGEN SATURATION: 98 %

## 2020-06-30 DIAGNOSIS — I73.9 PAD (PERIPHERAL ARTERY DISEASE) (HCC): ICD-10-CM

## 2020-06-30 DIAGNOSIS — I10 ESSENTIAL HYPERTENSION: ICD-10-CM

## 2020-06-30 DIAGNOSIS — I65.23 BILATERAL CAROTID ARTERY STENOSIS: Primary | ICD-10-CM

## 2020-06-30 DIAGNOSIS — Z87.891 PERSONAL HISTORY OF NICOTINE DEPENDENCE: ICD-10-CM

## 2020-06-30 DIAGNOSIS — M54.9 CHRONIC BACK PAIN, UNSPECIFIED BACK LOCATION, UNSPECIFIED BACK PAIN LATERALITY: ICD-10-CM

## 2020-06-30 DIAGNOSIS — I77.811 AORTIC ECTASIA, ABDOMINAL (HCC): ICD-10-CM

## 2020-06-30 DIAGNOSIS — I65.23 BILATERAL CAROTID ARTERY STENOSIS: ICD-10-CM

## 2020-06-30 DIAGNOSIS — G89.29 CHRONIC BACK PAIN, UNSPECIFIED BACK LOCATION, UNSPECIFIED BACK PAIN LATERALITY: ICD-10-CM

## 2020-06-30 PROCEDURE — 93923 UPR/LXTR ART STDY 3+ LVLS: CPT | Performed by: SURGERY

## 2020-06-30 PROCEDURE — 93880 EXTRACRANIAL BILAT STUDY: CPT

## 2020-06-30 PROCEDURE — 99214 OFFICE O/P EST MOD 30 MIN: CPT | Performed by: NURSE PRACTITIONER

## 2020-06-30 PROCEDURE — 93880 EXTRACRANIAL BILAT STUDY: CPT | Performed by: SURGERY

## 2020-06-30 PROCEDURE — 93923 UPR/LXTR ART STDY 3+ LVLS: CPT

## 2020-06-30 NOTE — PROGRESS NOTES
"6/30/2020       Juli Ackerman APRN   [address] [facility] [address]    Virgil Knight  1955    Chief Complaint   Patient presents with   • Follow-up     2 wk f/u with carotid and ABIs       Dear Juli Ackerman APRN   HPI  I had the pleasure of seeing your patient Virgil Knight in the office today.    As you recall, Virgil Knight is a 65 y.o.  male who you are currently following for routine health maintenance.  He was initially sent over by gastroenterology for findings of  ectasia of aorta and iliac arteries with plaque.  She has a history of stroke and chronic numbness to his right arm.  He has previous back surgery 3 years ago.  He also complains of pain and numbness in his right leg.  Unfortunately, he is a smoker.  He is maintained on full dose aspirin.  He did have noninvasive testing performed today, which I did review in office.           Review of Systems   Constitutional: Negative.    HENT: Negative.    Eyes: Negative.    Respiratory: Positive for shortness of breath.    Cardiovascular: Negative.    Gastrointestinal: Negative.    Endocrine: Negative.    Genitourinary: Negative.    Musculoskeletal: Positive for back pain and gait problem.   Skin: Negative.    Allergic/Immunologic: Negative.    Neurological: Positive for numbness.   Hematological: Negative.    Psychiatric/Behavioral: Negative.    All other systems reviewed and are negative.    /78   Pulse 60   Ht 180.3 cm (71\")   Wt 60.3 kg (133 lb)   SpO2 98%   BMI 18.55 kg/m²     Physical Exam   Constitutional: He is oriented to person, place, and time. Vital signs are normal. He appears well-developed and well-nourished. He is cooperative.   HENT:   Head: Normocephalic and atraumatic.   Eyes: Pupils are equal, round, and reactive to light. No scleral icterus.   Neck: Normal range of motion. Neck supple. No JVD present. Carotid bruit is not present. No thyromegaly present.   Cardiovascular: Normal rate, regular " rhythm, normal heart sounds and intact distal pulses.   Pulses:       Carotid pulses are 2+ on the right side, and 2+ on the left side.       Femoral pulses are 2+ on the right side, and 2+ on the left side.  Pulmonary/Chest: Effort normal and breath sounds normal.   Abdominal: Soft. Bowel sounds are normal. He exhibits no distension, no abdominal bruit and no mass. There is no hepatosplenomegaly. There is no tenderness.   Musculoskeletal: Normal range of motion. He exhibits no edema.   Right foot turns in due to stroke   Lymphadenopathy:     He has no cervical adenopathy.   Neurological: He is alert and oriented to person, place, and time. He has normal strength. No cranial nerve deficit or sensory deficit.   Skin: Skin is warm, dry and intact.   Psychiatric: He has a normal mood and affect. His behavior is normal. Judgment and thought content normal.   Nursing note and vitals reviewed.    Diagnostic data:  Noninvasive testing including ABIs show right TYE of 1.08 and a left TYE of 1.04.  Carotid duplex shows less than 50% carotid stenosis bilaterally with bilateral antegrade vertebral flow.  Patient Active Problem List   Diagnosis   • History of colon polyps   • Platelet inhibition due to Plavix   • Apical lung scarring   • Chronic back pain   • Centrilobular emphysema (CMS/HCC)   • Personal history of nicotine dependence   • COPD, group C, by GOLD 2017 classification (CMS/HCC)   • Abdominal pain, epigastric   • Hypertension        Diagnosis Plan   1. Bilateral carotid artery stenosis  US Carotid Bilateral   2. PAD (peripheral artery disease) (CMS/HCC)  US Ankle / Brachial Indices Extremity Complete   3. Essential hypertension     4. Aortic ectasia, abdominal (CMS/HCC)     5. Personal history of nicotine dependence         Plan: After thoroughly evaluating Virgil Knight, I believe the best course of action is to remain conservative from vascular surgery standpoint.  Previously his CTA of the abdomen pelvis  showed significant clot burden in the aortic wall with ectasia.  I did review his testing today and ABIs are within normal limits.  His carotid duplex also shows less than 50% carotid stenosis bilaterally.  His leg discomfort could be a combination of just the neuropathy from previous stroke and could be worsening of his lumbar disease.  He does state this is getting worse.  I recommended he follow-up with his previous spine surgeon as his ABIs were normal.  We will see him back in 1 year with repeat noninvasive testing for continued surveillance, including a carotid duplex, ABIs, and an ultrasound of the aorta.  I did discuss vascular risk factors as they pertain to the progression of vascular disease including controlling hypertension and tobacco abuse.  His blood pressure is controlled on his current medication regimen.  Tobacco abuse counseling was discussed for 3 minutes, however does not have any interest in quitting at this time. The patient is to continue taking their medications as previously discussed.   This was all discussed in full with complete understanding.  Thank you for allowing me to participate in the care of your patient.  Please do not hesitate to call with any questions or concerns.  We will keep you aware of any further encounters with Virgil Knight.        Sincerely yours,         ELMER Buckley Allie, APRN

## 2020-07-01 NOTE — TELEPHONE ENCOUNTER
Patient is requesting [facility]. Patient notified samples are available and will .

## 2020-07-12 ENCOUNTER — TELEPHONE (OUTPATIENT)
Dept: GASTROENTEROLOGY | Facility: CLINIC | Age: 65
End: 2020-07-12

## 2020-07-12 NOTE — TELEPHONE ENCOUNTER
Make a referral to heme/onco regarding the adenoma on the adrenal gland. I discussed his CT and ultrasound with Dr Ordaz and he agrees. Follow up/recall in 6 months for repeat ultrasound of the liver. Thanks Maryanne.

## 2020-07-13 DIAGNOSIS — D35.02 ADENOMA OF LEFT ADRENAL GLAND: Primary | ICD-10-CM

## 2020-07-20 ENCOUNTER — TELEPHONE (OUTPATIENT)
Dept: ONCOLOGY | Facility: CLINIC | Age: 65
End: 2020-07-20

## 2020-07-20 NOTE — TELEPHONE ENCOUNTER
Called to follow up on referral that was put in, Lindsey stated they would call the patient today.

## 2020-07-20 NOTE — TELEPHONE ENCOUNTER
Caller: Shana Knight    Relationship to patient: Wife    Best call back number: [phone]    Chief complaint: Pt    Type of visit: New Pt Appt    Requested date: 8.10.20 before or after 10:30, around the time of his wife's appt with Dr. Ortiz    Additional notes:  Pt rides with his wife to appts and would like same day appts with his wife, if possible.

## 2020-08-03 NOTE — PROGRESS NOTES
MGW ONC [facility] HEMATOLOGY AND ONCOLOGY  [address]  [facility] [place] [facility] [place] [address] [phone]    Patient Name: Virgil Knight  Encounter Date: 08/10/2020  YOB: 1955  Patient Number: [id number]    Initial Note    REASON FOR CONSULTATION: Virgil Knight is a pleasant 65 y.o.  male referred by ELMER Frances for diagnostic and management recommendations for left adrenal nodule.  He is seen with spouse, Shana.  History is obtained from patient. History is considered to be accurate.      HISTORY OF PRESENT ILLNESS: Patient presented with abdominal pain.    The patient was seen by ELMER Frances 05/14/2020 for abdominal pain.  CT scan of the abdomen ordered.    CT abdomen pelvis 05/20/2020 showed scattered hypodensities within the liver felt to represent hepatic  cysts. The smaller lesions are difficult to characterize. The largest of these is within the left lobe of the liver measuring 9 mm in size.  Left adrenal nodule-likely represent an adenoma. Right adrenal is unremarkable. No evidence of discrete renal mass or nephrolithiasis.  Ectasia of the abdominal aorta and iliac arteries with extensive  plaquing. There is associated stenosis in the common iliac arteries. No evidence of retroperitoneal hematoma or adenopathy. There is atheromatous calcification within the proximal segment of the SMA.   Normal bowel gas pattern with no obstruction or free air. There is diverticulosis of the sigmoid colon with muscular hypertrophy. There is no evidence of mechanical obstruction. The appendix is identified and is normal in appearance.  Diffuse enlargement of the prostate gland. Urinary bladder wall is prominent but the bladder is partially obscured secondary to extensive streaking artifact from the patient's right total hip arthroplasty.     Sonogram of the liver 05/29/2020 showed benign hepatic cysts.    Endoscopy  06/05/2020 showed normal examined duodenum. Biopsied.  Normal stomach.  Z-line regular, 44 cm from the incisors.    Pathology 0.060 820 small bowel biopsy was unremarkable duodenal mucosa with no evidence of celiac disease or Giardia.    With the above background, he is seen.  LABS    Lab Results - Last 18 Months   Lab Units 06/08/19  0215 06/03/19  1530   HEMOGLOBIN g/dL 11.2* 13.5*   HEMATOCRIT % 34.1* 41.9*   MCV fL 93.4 94.6*   WBC K/uL 9.2 7.4   RDW % 14.5 14.5   MPV fL 9.3* 9.1*   PLATELETS K/uL 255 321   NEUTROS ABS K/uL  --  4.3   LYMPHS ABS K/uL  --  1.9   MONOS ABS K/uL  --  0.70   EOS ABS K/uL  --  0.30   BASOS ABS K/uL  --  0.10       Lab Results - Last 18 Months   Lab Units 05/20/20  1132 06/03/19  1530   GLUCOSE mg/dL  --  81   SODIUM mmol/L  --  136   POTASSIUM mmol/L  --  4.6   TOTAL CO2 mmol/L  --  27   CHLORIDE mmol/L  --  98   ANION GAP mmol/L  --  11   CREATININE mg/dL 0.70 0.7   BUN mg/dL  --  9   CALCIUM mg/dL  --  9.3   EGFR IF NONAFRICN AM   --  >60       No results for input(s): MSPIKE, KAPPALAMB, IGLFLC, URICACID, FREEKAPPAL, CEA, LDH, REFLABREPO in the last 52862 hours.    No results for input(s): IRON, TIBC, LABIRON, FERRITIN, H4PFKVJ, TSH, FOLATE in the last 98794 hours.    Invalid input(s): VITB12      PAST MEDICAL HISTORY:  ALLERGIES:  Allergies   Allergen Reactions   • Amoxicillin-Pot Clavulanate Rash   • Codeine Nausea And Vomiting     CURRENT MEDICATIONS:  Outpatient Encounter Medications as of 8/10/2020   Medication Sig Dispense Refill   • amLODIPine (NORVASC) 10 MG tablet Take 10 mg by mouth.     • Aspirin Buf,EvBrv-OfZmi-AaXmp, (ASCRIPTIN) 325 MG tablet Take 325 mg by mouth.     • budesonide-formoterol (SYMBICORT) 160-4.5 MCG/ACT inhaler Inhale 2 puffs by mouth twice daily 33 g 3   • citalopram (CeleXA) 20 MG tablet Take 20 mg by mouth daily.     • divalproex (DEPAKOTE) 500 MG DR tablet Take 500 mg by mouth daily.     • lamoTRIgine (LaMICtal) 200 MG tablet Take 200 mg by mouth  Daily.     • lisinopril (PRINIVIL,ZESTRIL) 40 MG tablet Take 40 mg by mouth Daily.     • metoprolol tartrate (LOPRESSOR) 25 MG tablet Take 25 mg by mouth daily. 1/2     • pantoprazole (PROTONIX) 40 MG EC tablet Take 40 mg by mouth daily.     • tiotropium bromide monohydrate (Spiriva Respimat) 2.5 MCG/ACT aerosol solution inhaler Inhale 2 puffs Daily. 1 inhaler 0     No facility-administered encounter medications on file as of 8/10/2020.      ADULT ILLNESSES:  Patient Active Problem List   Diagnosis Code   • History of colon polyps Z86.010   • Platelet inhibition due to Plavix Z79.02   • Apical lung scarring J98.4   • Chronic back pain M54.9, G89.29   • Centrilobular emphysema (CMS/[facility]) J43.2   • Personal history of nicotine dependence Z87.891   • COPD, group C, by GOLD 2017 classification (CMS/[facility]) J44.9   • Abdominal pain, epigastric R10.13   • Hypertension I10     SURGERIES:  Past Surgical History:   Procedure Laterality Date   • COLONOSCOPY  04/22/2011    ADENOMATOUS/HYPERPLASTIC POLYP   • COLONOSCOPY N/A 12/12/2016    Hyperplastic polyp ascending colon, Diverticulosis repeat exam in 5 years   • ENDOSCOPY  09/05/2014       • ENDOSCOPY N/A 6/5/2020    Procedure: ESOPHAGOGASTRODUODENOSCOPY WITH ANESTHESIA;  Surgeon: Chnadra Ordaz MD;  Location: [facility] ENDOSCOPY;  Service: Gastroenterology;  Laterality: N/A;  preop; abdominal pain  postop; normal   PCP none    • HIP FRACTURE SURGERY     • SHOULDER SURGERY Bilateral    • WRIST SURGERY Right 2013     HEALTH MAINTENANCE ITEMS:  Health Maintenance Due   Topic Date Due   • TDAP/TD VACCINES (1 - Tdap) 02/19/1967   • PNEUMOCOCCAL VACCINE (19-64 MEDIUM RISK) (1 of 1 - PPSV23) 02/19/1975   • ZOSTER VACCINE (1 of 2) 02/19/2006   • HEPATITIS C SCREENING  11/14/2018   • MEDICARE ANNUAL WELLNESS  11/14/2018   • INFLUENZA VACCINE  08/01/2020       <no information>  Last Completed Colonoscopy       Status Date      COLONOSCOPY Done 12/12/2016 Surg:COLONOSCOPY     Patient has  more history with this topic...        Immunization History   Administered Date(s) Administered   • FLUARIX/FLUZONE/AFLURIA/FLULAVAL QUAD 11/20/2019   • flucelvax quad pfs =>4 YRS 11/21/2018     Last Completed Mammogram     Patient has no health maintenance due at this time            FAMILY HISTORY:  Family History   Problem Relation Age of Onset   • Colon cancer Other    • Colon polyps Other      SOCIAL HISTORY:  Social History     Socioeconomic History   • Marital status:      Spouse name: Not on file   • Number of children: Not on file   • Years of education: Not on file   • Highest education level: Not on file   Tobacco Use   • Smoking status: Current Every Day Smoker     Packs/day: 0.50     Years: 30.00     Pack years: 15.00     Types: Cigarettes     Start date: 1976   • Smokeless tobacco: Never Used   Substance and Sexual Activity   • Alcohol use: No   • Drug use: No   • Sexual activity: Defer       REVIEW OF SYSTEMS:  Review of Systems   Constitutional: Negative for chills, diaphoresis, fatigue and fever.   HENT: Negative for congestion, hearing loss and trouble swallowing.    Eyes: Negative for redness and visual disturbance.   Respiratory: Negative for cough, shortness of breath and wheezing.    Cardiovascular: Negative for chest pain and leg swelling.   Gastrointestinal: Negative for abdominal pain, blood in stool, constipation, nausea and vomiting.   Endocrine: Negative for cold intolerance and heat intolerance.   Genitourinary: Negative for difficulty urinating, flank pain and hematuria.   Musculoskeletal: Negative for myalgias and neck stiffness.   Skin: Negative for pallor.   Allergic/Immunologic: Negative for food allergies.   Neurological: Negative for speech difficulty, weakness and confusion.   Hematological: Negative for adenopathy. Does not bruise/bleed easily.   Psychiatric/Behavioral: Negative for agitation and hallucinations. The patient is not nervous/anxious.        /60    "Pulse 64   Temp 98.1 °F (36.7 °C)   Resp 18   Ht 180.3 cm (71\")   Wt 64.7 kg (142 lb 9.6 oz)   SpO2 98%   BMI 19.89 kg/m²  Body surface area is 1.83 meters squared.  Pain Score    08/10/20 0925   PainSc: 0-No pain       Physical Exam:  Physical Exam   Constitutional: He appears well-developed. No distress.   HENT:   Head: Normocephalic and atraumatic.   Eyes: No scleral icterus.   Neck: Neck supple.   Cardiovascular: Normal rate and regular rhythm.   Pulmonary/Chest: Effort normal and breath sounds normal. He has no wheezes. He has no rales.   Abdominal: Soft. Bowel sounds are normal. He exhibits no distension. There is no tenderness.   Musculoskeletal: He exhibits no edema.   Lymphadenopathy:     He has no cervical adenopathy.   Skin: Skin is warm and dry. He is not diaphoretic. No pallor.   Psychiatric: He has a normal mood and affect. His behavior is normal. Judgment and thought content normal.   Vitals reviewed.      Virgil Knight reports a pain score of 0.     Patient's Body mass index is 19.89 kg/m². BMI is within normal parameters. No follow-up required..  ASSESSMENT:  1.  Left adrenal nodule.  2.  Abdominal aortic ectasia followed by Dr. Ott.  3.  Emphysema.  4.  Hypertension.  5.  History of right sided hemiparesis from a stroke.       PLAN:  1.   regarding the reason for the referral.  2.  Blood for CBC with differential and CMP.  3.  Order MRI of the abdomen, left adrenal nodule.  4.  Stable for observation.  5.  Continue current medications.  6.  Continue care per primary care physician and other specialists.  7.  Plan of care discussed with patient and spouse.  Understanding expressed.  Patient agreeable to proceed.  8.  Return to office in 4 months with pre-office CBC with differential and CMP if MRI is negative.  9.  Further recommendations pending.  10.  He will undergo right hip surgery in [place] on 08/24/2020.       Thank you for the referral.      I spent 32 total minutes, " face-to-face, caring for Virgil today.  Greater than 50% of this time involved counseling and/or coordination of care as documented within this note regarding the patient's illness(es), pros and cons of various treatment options, instructions and/or risk reduction.      (Chandra Ordaz MD)  ELMER Snow

## 2020-08-10 ENCOUNTER — CONSULT (OUTPATIENT)
Dept: ONCOLOGY | Facility: CLINIC | Age: 65
End: 2020-08-10

## 2020-08-10 ENCOUNTER — LAB (OUTPATIENT)
Dept: LAB | Facility: HOSPITAL | Age: 65
End: 2020-08-10

## 2020-08-10 VITALS
TEMPERATURE: 98.1 F | DIASTOLIC BLOOD PRESSURE: 60 MMHG | OXYGEN SATURATION: 98 % | SYSTOLIC BLOOD PRESSURE: 122 MMHG | HEART RATE: 64 BPM | HEIGHT: 71 IN | BODY MASS INDEX: 19.96 KG/M2 | RESPIRATION RATE: 18 BRPM | WEIGHT: 142.6 LBS

## 2020-08-10 DIAGNOSIS — E27.8 ADRENAL CORTICAL NODULE (HCC): Primary | ICD-10-CM

## 2020-08-10 DIAGNOSIS — Z86.010 HISTORY OF COLON POLYPS: Primary | ICD-10-CM

## 2020-08-10 DIAGNOSIS — D50.9 IRON DEFICIENCY ANEMIA, UNSPECIFIED IRON DEFICIENCY ANEMIA TYPE: ICD-10-CM

## 2020-08-10 LAB
ALBUMIN SERPL-MCNC: 4.4 G/DL (ref 3.5–5.2)
ALBUMIN/GLOB SERPL: 1.5 G/DL
ALP SERPL-CCNC: 65 U/L (ref 39–117)
ALT SERPL W P-5'-P-CCNC: 7 U/L (ref 1–41)
ANION GAP SERPL CALCULATED.3IONS-SCNC: 9 MMOL/L (ref 5–15)
AST SERPL-CCNC: 12 U/L (ref 1–40)
BASOPHILS # BLD AUTO: 0.09 10*3/MM3 (ref 0–0.2)
BASOPHILS NFR BLD AUTO: 1.1 % (ref 0–1.5)
BILIRUB SERPL-MCNC: 0.3 MG/DL (ref 0–1.2)
BUN SERPL-MCNC: 11 MG/DL (ref 8–23)
BUN/CREAT SERPL: 17.7 (ref 7–25)
CALCIUM SPEC-SCNC: 9.4 MG/DL (ref 8.6–10.5)
CHLORIDE SERPL-SCNC: 98 MMOL/L (ref 98–107)
CO2 SERPL-SCNC: 30 MMOL/L (ref 22–29)
CREAT SERPL-MCNC: 0.62 MG/DL (ref 0.76–1.27)
DEPRECATED RDW RBC AUTO: 50.5 FL (ref 37–54)
EOSINOPHIL # BLD AUTO: 0.34 10*3/MM3 (ref 0–0.4)
EOSINOPHIL NFR BLD AUTO: 4.3 % (ref 0.3–6.2)
ERYTHROCYTE [DISTWIDTH] IN BLOOD BY AUTOMATED COUNT: 15.3 % (ref 12.3–15.4)
FERRITIN SERPL-MCNC: 31.41 NG/ML (ref 30–400)
GFR SERPL CREATININE-BSD FRML MDRD: 131 ML/MIN/1.73
GLOBULIN UR ELPH-MCNC: 3 GM/DL
GLUCOSE SERPL-MCNC: 73 MG/DL (ref 65–99)
HCT VFR BLD AUTO: 38.6 % (ref 37.5–51)
HGB BLD-MCNC: 12.8 G/DL (ref 13–17.7)
IMM GRANULOCYTES # BLD AUTO: 0.05 10*3/MM3 (ref 0–0.05)
IMM GRANULOCYTES NFR BLD AUTO: 0.6 % (ref 0–0.5)
IRON 24H UR-MRATE: 73 MCG/DL (ref 59–158)
IRON SATN MFR SERPL: 17 % (ref 20–50)
LYMPHOCYTES # BLD AUTO: 1.84 10*3/MM3 (ref 0.7–3.1)
LYMPHOCYTES NFR BLD AUTO: 23.2 % (ref 19.6–45.3)
MCH RBC QN AUTO: 30 PG (ref 26.6–33)
MCHC RBC AUTO-ENTMCNC: 33.2 G/DL (ref 31.5–35.7)
MCV RBC AUTO: 90.6 FL (ref 79–97)
MONOCYTES # BLD AUTO: 0.85 10*3/MM3 (ref 0.1–0.9)
MONOCYTES NFR BLD AUTO: 10.7 % (ref 5–12)
NEUTROPHILS NFR BLD AUTO: 4.75 10*3/MM3 (ref 1.7–7)
NEUTROPHILS NFR BLD AUTO: 60.1 % (ref 42.7–76)
NRBC BLD AUTO-RTO: 0 /100 WBC (ref 0–0.2)
PLATELET # BLD AUTO: 355 10*3/MM3 (ref 140–450)
PMV BLD AUTO: 8.5 FL (ref 6–12)
POTASSIUM SERPL-SCNC: 4.7 MMOL/L (ref 3.5–5.2)
PROT SERPL-MCNC: 7.4 G/DL (ref 6–8.5)
RBC # BLD AUTO: 4.26 10*6/MM3 (ref 4.14–5.8)
RETICS # AUTO: 0.06 10*6/MM3 (ref 0.02–0.13)
RETICS/RBC NFR AUTO: 1.3 % (ref 0.7–1.9)
SODIUM SERPL-SCNC: 137 MMOL/L (ref 136–145)
TIBC SERPL-MCNC: 437 MCG/DL (ref 298–536)
TRANSFERRIN SERPL-MCNC: 293 MG/DL (ref 200–360)
WBC # BLD AUTO: 7.92 10*3/MM3 (ref 3.4–10.8)

## 2020-08-10 PROCEDURE — 85045 AUTOMATED RETICULOCYTE COUNT: CPT | Performed by: INTERNAL MEDICINE

## 2020-08-10 PROCEDURE — 85025 COMPLETE CBC W/AUTO DIFF WBC: CPT | Performed by: INTERNAL MEDICINE

## 2020-08-10 PROCEDURE — 83540 ASSAY OF IRON: CPT | Performed by: INTERNAL MEDICINE

## 2020-08-10 PROCEDURE — 82728 ASSAY OF FERRITIN: CPT | Performed by: INTERNAL MEDICINE

## 2020-08-10 PROCEDURE — 80053 COMPREHEN METABOLIC PANEL: CPT | Performed by: INTERNAL MEDICINE

## 2020-08-10 PROCEDURE — 84466 ASSAY OF TRANSFERRIN: CPT | Performed by: INTERNAL MEDICINE

## 2020-08-10 PROCEDURE — 36415 COLL VENOUS BLD VENIPUNCTURE: CPT | Performed by: INTERNAL MEDICINE

## 2020-08-10 PROCEDURE — 99203 OFFICE O/P NEW LOW 30 MIN: CPT | Performed by: INTERNAL MEDICINE

## 2020-08-10 RX ORDER — FERROUS SULFATE 325(65) MG
325 TABLET ORAL
Qty: 60 TABLET | Refills: 2 | Status: SHIPPED | OUTPATIENT
Start: 2020-08-10 | End: 2021-06-14

## 2020-08-10 NOTE — TELEPHONE ENCOUNTER
Discussed with patient regarding his iron profile and low iron sat, informed him that prescription to be called to pharmacy for oral iron tablets. Will recheck his lab values at his next joanne apt. He v/u.

## 2020-08-14 ENCOUNTER — HOSPITAL ENCOUNTER (OUTPATIENT)
Dept: MRI IMAGING | Facility: HOSPITAL | Age: 65
Discharge: HOME | End: 2020-08-14
Admitting: INTERNAL MEDICINE

## 2020-08-14 ENCOUNTER — APPOINTMENT (OUTPATIENT)
Dept: MRI IMAGING | Facility: HOSPITAL | Age: 65
End: 2020-08-14

## 2020-08-14 ENCOUNTER — TELEPHONE (OUTPATIENT)
Dept: ONCOLOGY | Facility: CLINIC | Age: 65
End: 2020-08-14

## 2020-08-14 DIAGNOSIS — E27.8 ADRENAL CORTICAL NODULE (HCC): ICD-10-CM

## 2020-08-14 PROCEDURE — 0 GADOBENATE DIMEGLUMINE 529 MG/ML SOLUTION: Performed by: INTERNAL MEDICINE

## 2020-08-14 PROCEDURE — A9577 INJ MULTIHANCE: HCPCS | Performed by: INTERNAL MEDICINE

## 2020-08-14 PROCEDURE — 74183 MRI ABD W/O CNTR FLWD CNTR: CPT

## 2020-08-14 RX ADMIN — GADOBENATE DIMEGLUMINE 12 ML: 529 INJECTION, SOLUTION INTRAVENOUS at 10:02

## 2020-08-14 NOTE — TELEPHONE ENCOUNTER
----- Message from Mickey Ortiz MD sent at 8/14/2020 11:32 AM CDT -----  Notify patient.  No renal mass identified.  1.5 cm LEFT adrenal gland adenoma.  1.1 cm pancreatic uncinate cyst (plan to repeat CT in 12 months).

## 2020-08-14 NOTE — TELEPHONE ENCOUNTER
Called patient and reviewed results of MRI.  Instructed that we will repeat it in 1 year.  Patient v/u.

## 2020-09-02 ENCOUNTER — TELEPHONE (OUTPATIENT)
Dept: ONCOLOGY | Facility: CLINIC | Age: 65
End: 2020-09-02

## 2020-09-02 NOTE — TELEPHONE ENCOUNTER
Called to see if Virgil did the stool cards that Dr. Ortiz has ordered at his last appointment.  Shana stated that they were not aware he needed to do this.

## 2020-09-11 ENCOUNTER — TELEPHONE (OUTPATIENT)
Dept: PULMONOLOGY | Facility: CLINIC | Age: 65
End: 2020-09-11

## 2020-09-11 NOTE — TELEPHONE ENCOUNTER
Called patient to let him know we have Spiriva samples up front for him. Advised patient that when he pulls into our office to call and we will bring them out.

## 2020-11-10 NOTE — PROGRESS NOTES
ELMER Parson  [facility]   Respiratory Disease Clinic  [address] [place] [address]  Phone: [phone]  Fax: [phone]     Virgil Knight is a 65 y.o. male.   CC:   Chief Complaint   Patient presents with   • apical lung scarring        HPI: Mr. Knight is being evaluated today for for management of his lung scarring and COPD.  He experiences moderate persistent shortness of breath occurring in the chest daily for several years.  It is worsened by activity and managed very well with bronchodilators.  He is on Spiriva and Symbicort daily.  He does not require supplemental oxygen.    He had a hip replacement in August.  He reports they gave him Dulera while he was in the hospital.  He felt like this was more beneficial than the Symbicort.  He would like a prescription.    The following portions of the patient's history were reviewed and updated as appropriate: allergies, current medications, past family history, past medical history, past social history, past surgical history and problem list.    Past Medical History:   Diagnosis Date   • Colon polyps    • COPD, group C, by GOLD 2017 classification (CMS/HCC) 11/14/2019   • Hiatal hernia    • Hypertension    • Personal history of nicotine dependence 11/14/2019   • Seizure disorder (CMS/[facility])    • Stroke (CMS/[facility])        Prior to Admission medications    Medication Sig Start Date End Date Taking? Authorizing Provider   amLODIPine (NORVASC) 10 MG tablet Take 10 mg by mouth.    Ava Floyd MD   Aspirin Buf,FkQck-QrLmu-QvCxm, (ASCRIPTIN) 325 MG tablet Take 325 mg by mouth. 6/8/19   Ava Floyd MD   budesonide-formoterol (SYMBICORT) 160-4.5 MCG/ACT inhaler Inhale 2 puffs by mouth twice daily 4/13/20   Devon Braun MD   citalopram (CeleXA) 20 MG tablet Take 20 mg by mouth daily.    Ava Floyd MD   divalproex (DEPAKOTE) 500 MG DR tablet Take 500 mg by mouth daily.    Ava Floyd MD    ferrous sulfate 325 (65 FE) MG tablet Take 1 tablet by mouth Daily With Breakfast. 8/10/20   Mickey Ortiz MD   lamoTRIgine (LaMICtal) 200 MG tablet Take 200 mg by mouth Daily.    ProviderAva MD   lisinopril (PRINIVIL,ZESTRIL) 40 MG tablet Take 40 mg by mouth Daily.    ProviderAva MD   metoprolol tartrate (LOPRESSOR) 25 MG tablet Take 25 mg by mouth daily. 1/2    Ava Floyd MD   pantoprazole (PROTONIX) 40 MG EC tablet Take 40 mg by mouth daily.    ProviderAva MD   tiotropium bromide monohydrate (Spiriva Respimat) 2.5 MCG/ACT aerosol solution inhaler Inhale 2 puffs Daily. 9/11/20   Rubina Hernández APRN       Family History   Problem Relation Age of Onset   • Colon cancer Other    • Colon polyps Other        Social History     Socioeconomic History   • Marital status:      Spouse name: Not on file   • Number of children: Not on file   • Years of education: Not on file   • Highest education level: Not on file   Tobacco Use   • Smoking status: Current Every Day Smoker     Packs/day: 0.50     Years: 30.00     Pack years: 15.00     Types: Cigarettes     Start date: 1976   • Smokeless tobacco: Never Used   Substance and Sexual Activity   • Alcohol use: No   • Drug use: No   • Sexual activity: Defer       Review of Systems   Constitutional: Negative for activity change, chills, fatigue and fever.   HENT: Negative for congestion, postnasal drip, rhinorrhea, sinus pressure, sore throat and trouble swallowing.    Eyes: Negative for blurred vision, double vision and pain.   Respiratory: Positive for shortness of breath. Negative for cough, chest tightness and wheezing.    Cardiovascular: Negative for chest pain, palpitations and leg swelling.   Gastrointestinal: Negative for abdominal distention, constipation, diarrhea, nausea and vomiting.   Endocrine: Negative for polydipsia, polyphagia and polyuria.   Genitourinary: Negative for dysuria, frequency and urgency.  "  Musculoskeletal: Positive for joint swelling. Negative for arthralgias, back pain and gait problem.   Skin: Negative for color change, dry skin, rash and skin lesions.   Allergic/Immunologic: Negative for environmental allergies, food allergies and immunocompromised state.   Neurological: Negative for dizziness, seizures, speech difficulty, weakness, light-headedness, memory problem and confusion.   Hematological: Negative for adenopathy. Does not bruise/bleed easily.   Psychiatric/Behavioral: Negative for sleep disturbance, negative for hyperactivity and depressed mood. The patient is not nervous/anxious.        /90   Pulse 62   Temp 99.7 °F (37.6 °C)   Ht 180.3 cm (71\")   Wt 66 kg (145 lb 6.4 oz)   SpO2 98%   BMI 20.28 kg/m²     Physical Exam  Vitals signs and nursing note reviewed.   Constitutional:       General: He is not in acute distress.     Appearance: He is well-developed. He is not diaphoretic.      Interventions: Face mask in place.   HENT:      Head: Normocephalic and atraumatic.      Right Ear: External ear normal.      Left Ear: External ear normal.      Nose: Nose normal.      Mouth/Throat:      Pharynx: No oropharyngeal exudate.   Eyes:      General:         Right eye: No discharge.         Left eye: No discharge.      Conjunctiva/sclera: Conjunctivae normal.      Pupils: Pupils are equal, round, and reactive to light.      Comments: Glasses on   Neck:      Musculoskeletal: Normal range of motion and neck supple.      Vascular: No JVD.   Cardiovascular:      Rate and Rhythm: Normal rate and regular rhythm.      Heart sounds: No murmur.   Pulmonary:      Effort: Pulmonary effort is normal. No respiratory distress.      Breath sounds: Normal breath sounds. No wheezing.   Abdominal:      General: Bowel sounds are normal. There is no distension.      Palpations: Abdomen is soft.      Tenderness: There is no abdominal tenderness.   Musculoskeletal: Normal range of motion.         General: " No deformity.   Skin:     General: Skin is warm and dry.      Findings: No erythema or rash.   Neurological:      Mental Status: He is alert and oriented to person, place, and time.      Cranial Nerves: No cranial nerve deficit.      Coordination: Coordination normal.      Deep Tendon Reflexes: Reflexes normal.   Psychiatric:         Behavior: Behavior normal.         Thought Content: Thought content normal.         Pulmonary Functions Testing Results:      Results for orders placed in visit on 11/21/18   Pulmonary Function Test         No PFTs for this visit    CXR: No imaging for this visit        Problem List Items Addressed This Visit        Respiratory    Apical lung scarring    Relevant Medications    mometasone-formoterol (DULERA 200) 200-5 MCG/ACT inhaler    tiotropium bromide monohydrate (Spiriva Respimat) 2.5 MCG/ACT aerosol solution inhaler    Centrilobular emphysema (CMS/[facility]) - Primary    Relevant Medications    mometasone-formoterol (DULERA 200) 200-5 MCG/ACT inhaler    tiotropium bromide monohydrate (Spiriva Respimat) 2.5 MCG/ACT aerosol solution inhaler    COPD, group C, by GOLD 2017 classification (CMS/[facility])    Relevant Medications    mometasone-formoterol (DULERA 200) 200-5 MCG/ACT inhaler    tiotropium bromide monohydrate (Spiriva Respimat) 2.5 MCG/ACT aerosol solution inhaler       Other    Personal history of nicotine dependence      Other Visit Diagnoses     Need for immunization against influenza        Relevant Orders    FluLaval Quad >6 Months ([id number])        Patient's Body mass index is 20.28 kg/m². BMI is within normal parameters. No follow-up required..      Assessment/Plan         COPD and emphysema/lung scarring remained stable.  He is on triple therapy and doing well.  He would like to switch to Dulera as opposed to Symbicort if it is covered.  He will continue the Spiriva.  He is no longer smoking.  Flu shot given today.  He prefers an annual follow-up.  We will update PFTs when he  returns.  Unable to do those today due to Covid.  He will call sooner if needed.    Rubina Hernández, APRN  11/25/2020  14:16 CST    Return in about 1 year (around 11/25/2021) for FVL.

## 2020-11-25 ENCOUNTER — TELEPHONE (OUTPATIENT)
Dept: PULMONOLOGY | Facility: CLINIC | Age: 65
End: 2020-11-25

## 2020-11-25 ENCOUNTER — OFFICE VISIT (OUTPATIENT)
Dept: PULMONOLOGY | Facility: CLINIC | Age: 65
End: 2020-11-25

## 2020-11-25 VITALS
BODY MASS INDEX: 20.35 KG/M2 | HEIGHT: 71 IN | HEART RATE: 62 BPM | DIASTOLIC BLOOD PRESSURE: 90 MMHG | TEMPERATURE: 99.7 F | WEIGHT: 145.4 LBS | OXYGEN SATURATION: 98 % | SYSTOLIC BLOOD PRESSURE: 164 MMHG

## 2020-11-25 DIAGNOSIS — Z87.891 PERSONAL HISTORY OF NICOTINE DEPENDENCE: ICD-10-CM

## 2020-11-25 DIAGNOSIS — J43.2 CENTRILOBULAR EMPHYSEMA (HCC): Primary | ICD-10-CM

## 2020-11-25 DIAGNOSIS — J98.4 APICAL LUNG SCARRING: ICD-10-CM

## 2020-11-25 DIAGNOSIS — J44.9 COPD, GROUP C, BY GOLD 2017 CLASSIFICATION (HCC): ICD-10-CM

## 2020-11-25 DIAGNOSIS — Z23 NEED FOR IMMUNIZATION AGAINST INFLUENZA: ICD-10-CM

## 2020-11-25 PROCEDURE — G0008 ADMIN INFLUENZA VIRUS VAC: HCPCS | Performed by: NURSE PRACTITIONER

## 2020-11-25 PROCEDURE — 99214 OFFICE O/P EST MOD 30 MIN: CPT | Performed by: NURSE PRACTITIONER

## 2020-11-25 PROCEDURE — 90686 IIV4 VACC NO PRSV 0.5 ML IM: CPT | Performed by: NURSE PRACTITIONER

## 2020-11-25 RX ORDER — TIOTROPIUM BROMIDE INHALATION SPRAY 3.12 UG/1
2 SPRAY, METERED RESPIRATORY (INHALATION)
Qty: 2 INHALER | Refills: 0 | COMMUNITY
Start: 2020-11-25 | End: 2021-03-03 | Stop reason: ALTCHOICE

## 2020-12-01 NOTE — TELEPHONE ENCOUNTER
Patient said that the symbicort was really expensive so he asked about samples. I told him that we didn't. He is going to check with his pharmacy to see what is covered and affordable. He said he would let us know if he needs anything else.

## 2020-12-03 DIAGNOSIS — J44.9 COPD, GROUP C, BY GOLD 2017 CLASSIFICATION (HCC): Primary | ICD-10-CM

## 2020-12-03 RX ORDER — TIOTROPIUM BROMIDE INHALATION SPRAY 3.12 UG/1
2 SPRAY, METERED RESPIRATORY (INHALATION)
Qty: 1 INHALER | Refills: 0 | COMMUNITY
Start: 2020-12-03 | End: 2021-03-03 | Stop reason: ALTCHOICE

## 2020-12-03 NOTE — TELEPHONE ENCOUNTER
Patient is requesting samples of Spiriva. Patient was last seen on 11/25/20 and has a return appointment scheduled on 12/1/21.  Request sent to Rubina PAYNE.

## 2020-12-07 NOTE — PROGRESS NOTES
[facility] ONC [facility] HEMATOLOGY AND ONCOLOGY  [address]  [facility] [place] [address] [phone]    Patient Name: Virgil Knight  Encounter Date: 12/14/2020  YOB: 1955  Patient Number: [id number]      REASON FOR FOLLOW-UP: Virgil Knight is a pleasant 65 y.o.  male on follow up for left adrenal adenoma and a 1.1 cm pancreatic uncinate cyst on 08/14/2020.  He is also seen for normocytic anemia from iron deficiency and on oral iron for the past 4 months with resolution of anemia.  He is seen alone.  History is obtained from patient. History is considered to be accurate.      DIAGNOSTIC ABNORMALITIES:  Patient presented with abdominal pain.  The patient was seen by ELMER Frances 05/14/2020 for abdominal pain.  CT scan of the abdomen ordered.  CT abdomen pelvis 05/20/2020 showed scattered hypodensities within the liver felt to represent hepatic  cysts. The smaller lesions are difficult to characterize. The largest of these is within the left lobe of the liver measuring 9 mm in size.  Left adrenal nodule-likely represent an adenoma. Right adrenal is unremarkable. No evidence of discrete renal mass or nephrolithiasis.  Ectasia of the abdominal aorta and iliac arteries with extensive  plaquing. There is associated stenosis in the common iliac arteries. No evidence of retroperitoneal hematoma or adenopathy. There is atheromatous calcification within the proximal segment of the SMA.   Normal bowel gas pattern with no obstruction or free air. There is diverticulosis of the sigmoid colon with muscular hypertrophy. There is no evidence of mechanical obstruction. The appendix is identified and is normal in appearance.  Diffuse enlargement of the prostate gland. Urinary bladder wall is prominent but the bladder is partially obscured secondary to extensive streaking artifact from the patient's right total hip arthroplasty.   Sonogram  of the liver 05/29/2020 showed benign hepatic cysts.  Endoscopy 06/05/2020 showed normal examined duodenum. Biopsied.  Normal stomach.  Z-line regular, 44 cm from the incisors.  Pathology 0.060 820 small bowel biopsy was unremarkable duodenal mucosa with no evidence of celiac disease or Giardia.      PREVIOUS INTERVENTIONS:None.      PREVIOUS INTERVENTIONS: Anemia.  Ferrous sulfate 325 mg 08/10/2020 through 12/14/2020.        Oncology/Hematology History    No history exists.       PAST MEDICAL HISTORY:  ALLERGIES:  Allergies   Allergen Reactions   • Amoxicillin-Pot Clavulanate Rash   • Codeine Nausea And Vomiting     CURRENT MEDICATIONS:  Outpatient Encounter Medications as of 12/14/2020   Medication Sig Dispense Refill   • amLODIPine (NORVASC) 10 MG tablet Take 10 mg by mouth.     • Aspirin Buf,[id number]-[id number]-OaJfv, (ASCRIPTIN) 325 MG tablet Take 325 mg by mouth.     • citalopram (CeleXA) 20 MG tablet Take 20 mg by mouth daily.     • divalproex (DEPAKOTE) 500 MG DR tablet Take 500 mg by mouth daily.     • ferrous sulfate 325 (65 FE) MG tablet Take 1 tablet by mouth Daily With Breakfast. 60 tablet 2   • lamoTRIgine (LaMICtal) 200 MG tablet Take 200 mg by mouth Daily.     • lisinopril (PRINIVIL,ZESTRIL) 40 MG tablet Take 40 mg by mouth Daily.     • metoprolol tartrate (LOPRESSOR) 25 MG tablet Take 25 mg by mouth daily. 1/2     • mometasone-formoterol (DULERA 200) 200-5 MCG/ACT inhaler Inhale 2 puffs 2 (Two) Times a Day for 30 days. 1 inhaler 11   • pantoprazole (PROTONIX) 40 MG EC tablet Take 40 mg by mouth daily.     • tiotropium bromide monohydrate (Spiriva Respimat) 2.5 MCG/ACT aerosol solution inhaler Inhale 2 puffs Daily. 1 inhaler 0   • tiotropium bromide monohydrate (Spiriva Respimat) 2.5 MCG/ACT aerosol solution inhaler Inhale 2 puffs Daily for 14 days. 2 inhaler 0     No facility-administered encounter medications on file as of 12/14/2020.      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   • Chronic back pain M54.9, G89.29   • Centrilobular emphysema (CMS/HCC) J43.2   • Personal history of nicotine dependence Z87.891   • COPD, group C, by GOLD 2017 classification (CMS/HCC) J44.9   • Abdominal pain, epigastric R10.13   • Hypertension I10   • Iron deficiency anemia secondary to inadequate dietary iron intake D50.8     SURGERIES:  Past Surgical History:   Procedure Laterality Date   • COLONOSCOPY  04/22/2011    ADENOMATOUS/HYPERPLASTIC POLYP   • COLONOSCOPY N/A 12/12/2016    Hyperplastic polyp ascending colon, Diverticulosis repeat exam in 5 years   • ENDOSCOPY  09/05/2014       • ENDOSCOPY N/A 6/5/2020    Procedure: ESOPHAGOGASTRODUODENOSCOPY WITH ANESTHESIA;  Surgeon: Chandra Ordaz MD;  Location: [facility] ENDOSCOPY;  Service: Gastroenterology;  Laterality: N/A;  preop; abdominal pain  postop; normal   PCP none    • HIP FRACTURE SURGERY     • SHOULDER SURGERY Bilateral    • WRIST SURGERY Right 2013     HEALTH MAINTENANCE ITEMS:  Health Maintenance Due   Topic Date Due   • Pneumococcal Vaccine 0-64 (1 of 1 - PPSV23) 02/19/1962   • TDAP/TD VACCINES (1 - Tdap) 02/19/1975   • ZOSTER VACCINE (1 of 2) 02/19/2006   • HEPATITIS C SCREENING  11/14/2018   • ANNUAL WELLNESS VISIT  11/14/2018       <no information>  Last Completed Colonoscopy       Status Date      COLONOSCOPY Done 12/12/2016 Surg:COLONOSCOPY     Patient has more history with this topic...        Immunization History   Administered Date(s) Administered   • Flulaval/Fluarix/Fluzone Quad 11/20/2019, 11/25/2020   • flucelvax quad pfs =>4 YRS 11/21/2018     Last Completed Mammogram     Patient has no health maintenance due at this time            FAMILY HISTORY:  Family History   Problem Relation Age of Onset   • Colon cancer Other    • Colon polyps Other      SOCIAL HISTORY:  Social History     Socioeconomic History   • Marital status:  "     Spouse name: Not on file   • Number of children: Not on file   • Years of education: Not on file   • Highest education level: Not on file   Tobacco Use   • Smoking status: Current Every Day Smoker     Packs/day: 0.50     Years: 30.00     Pack years: 15.00     Types: Cigarettes     Start date: 1976   • Smokeless tobacco: Never Used   Substance and Sexual Activity   • Alcohol use: No   • Drug use: No   • Sexual activity: Defer       REVIEW OF SYSTEMS:    Review of Systems   Constitutional: Negative for chills, diaphoresis, fatigue and fever.        \"I feel good.\"   HENT: Negative for congestion, hearing loss and trouble swallowing.    Eyes: Negative for redness and visual disturbance.   Respiratory: Negative for cough, shortness of breath and wheezing.    Cardiovascular: Negative for chest pain and palpitations.   Gastrointestinal: Negative for abdominal pain, constipation, diarrhea, nausea and vomiting.   Endocrine: Negative for cold intolerance and heat intolerance.   Genitourinary: Negative for difficulty urinating, flank pain and hematuria.   Musculoskeletal: Negative for gait problem and joint swelling.   Skin: Negative for pallor.   Neurological: Negative for dizziness, speech difficulty and weakness.   Hematological: Negative for adenopathy. Does not bruise/bleed easily.   Psychiatric/Behavioral: Negative for agitation, confusion and hallucinations.       VITAL SIGNS: /66   Pulse 70   Temp 98.2 °F (36.8 °C)   Resp 18   Ht 180.3 cm (71\")   Wt 66.8 kg (147 lb 4.8 oz)   SpO2 96%   BMI 20.54 kg/m²   Pain Score    12/14/20 1018   PainSc: 0-No pain       PHYSICAL EXAMINATION:     Physical Exam  Vitals signs reviewed.   Constitutional:       General: He is not in acute distress.     Appearance: He is not toxic-appearing.   HENT:      Head: Normocephalic and atraumatic.   Neck:      Musculoskeletal: Neck supple.   Cardiovascular:      Rate and Rhythm: Normal rate and regular rhythm. "   Pulmonary:      Effort: No respiratory distress.      Breath sounds: No wheezing or rales.   Abdominal:      General: Abdomen is flat. Bowel sounds are normal.      Palpations: Abdomen is soft.      Tenderness: There is no abdominal tenderness.   Musculoskeletal:         General: No swelling.   Skin:     General: Skin is warm and dry.      Coloration: Skin is not pale.   Neurological:      Mental Status: He is alert.   Psychiatric:         Mood and Affect: Mood normal.         Behavior: Behavior normal.         Thought Content: Thought content normal.         Judgment: Judgment normal.         LABS    Lab Results - Last 18 Months   Lab Units 12/10/20  1041 08/10/20  1012   HEMOGLOBIN g/dL 13.6 12.8*   HEMATOCRIT % 40.9 38.6   MCV fL 92.5 90.6   WBC 10*3/mm3 6.15 7.92   RDW % 14.2 15.3   MPV fL 8.0 8.5   PLATELETS 10*3/mm3 273 355   IMM GRAN % % 0.2 0.6*   NEUTROS ABS 10*3/mm3 3.77 4.75   LYMPHS ABS 10*3/mm3 1.47 1.84   MONOS ABS 10*3/mm3 0.64 0.85   EOS ABS 10*3/mm3 0.23 0.34   BASOS ABS 10*3/mm3 0.03 0.09   IMMATURE GRANS (ABS) 10*3/mm3 0.01 0.05   NRBC /100 WBC  --  0.0       Lab Results - Last 18 Months   Lab Units 12/10/20  1041 08/10/20  1012 05/20/20  1132   GLUCOSE mg/dL 92 73  --    SODIUM mmol/L 134* 137  --    POTASSIUM mmol/L 4.4 4.7  --    CO2 mmol/L 27.0 30.0*  --    CHLORIDE mmol/L 98 98  --    ANION GAP mmol/L 9.0 9.0  --    CREATININE mg/dL 0.64* 0.62* 0.70   BUN mg/dL 8 11  --    BUN / CREAT RATIO  12.5 17.7  --    CALCIUM mg/dL 9.7 9.4  --    EGFR IF NONAFRICN AM mL/min/1.73 126 131  --    ALK PHOS U/L 64 65  --    TOTAL PROTEIN g/dL 7.0 7.4  --    ALT (SGPT) U/L 8 7  --    AST (SGOT) U/L 12 12  --    BILIRUBIN mg/dL 0.3 0.3  --    ALBUMIN g/dL 4.40 4.40  --    GLOBULIN gm/dL 2.6 3.0  --        No results for input(s): MSPIKE, KAPPALAMB, IGLFLC, URICACID, FREEKAPPAL, CEA, LDH, REFLABREPO in the last 33402 hours.    Lab Results - Last 18 Months   Lab Units 08/10/20  1012   IRON mcg/dL 73   TIBC  mcg/dL 437   IRON SATURATION % 17*   FERRITIN ng/mL 31.41       Virgil Knight reports a pain score of 0.      Patient's Body mass index is 20.54 kg/m². BMI is within normal parameters. No follow-up required.         Patient's Body mass index is 19.89 kg/m². BMI is within normal parameters. No follow-up required.      ASSESSMENT:  1.  Left adrenal adenoma.  2.  Abdominal aortic ectasia followed by Dr. Ott.  3.  Normocytic anemia from iron deficiency 08/10/2020.  4.  Hypertension.  5.  1.1 cm pancreatic uncinate cyst on 08/14/2020.  6.  History of right sided hemiparesis from a stroke.         PLAN:  1.   Re: MRI report 08/14/2020. No renal mass identified.   1.5 cm LEFT adrenal gland adenoma.    Multiple small hepatic cysts.   1.1 cm pancreatic uncinate cyst. Recommend a follow-up CT or MR in one year per ACR white paper criteria.  2.   Re: Heme status.  Hemoglobin 13.6.  3.   Re: CMP.  ml/minute.   4.   Re: Previous CBC with differential with hemoglobin 12.8 and CMP.  Anemia profile 08/10/2020 revealed a saturation of 17%, ferritin 31.4 and reticulocyte 1.3.  5.  Order MRI of the abdomen to follow 1.1 cm pancreatic uncinate cyst 08/2021.  6.  Stable for observation.  Discontinue oral iron, no anemia.   7.  Continue care per primary care physician and other specialists.  8.  Plan of care discussed with patient.  Understanding expressed.  Patient agreeable to proceed.  9.   Stool for occult blood x3.  10.  Fax MRI report to Dr. Ordaz, 1.1 cm pancreatic uncinate cyst.  11.  Return to office in 6 months with pre-office CBC with differential, ferritin, iron panel, and CMP.        I spent 27 total minutes, face-to-face, caring for Virgil tse.  Greater than 50% of this time involved counseling and/or coordination of care as documented within this note regarding the patient's illness(es), pros and cons of various treatment options, instructions and/or risk  reduction.        (Chandra Ordaz MD)  ELMER Snow APRN

## 2020-12-10 ENCOUNTER — LAB (OUTPATIENT)
Dept: ONCOLOGY | Facility: CLINIC | Age: 65
End: 2020-12-10

## 2020-12-10 DIAGNOSIS — E27.8 ADRENAL CORTICAL NODULE (HCC): ICD-10-CM

## 2020-12-10 LAB
ALBUMIN SERPL-MCNC: 4.4 G/DL (ref 3.5–5.2)
ALBUMIN/GLOB SERPL: 1.7 G/DL
ALP SERPL-CCNC: 64 U/L (ref 39–117)
ALT SERPL W P-5'-P-CCNC: 8 U/L (ref 1–41)
ANION GAP SERPL CALCULATED.3IONS-SCNC: 9 MMOL/L (ref 5–15)
AST SERPL-CCNC: 12 U/L (ref 1–40)
BASOPHILS # BLD AUTO: 0.03 10*3/MM3 (ref 0–0.2)
BASOPHILS NFR BLD AUTO: 0.5 % (ref 0–1.5)
BILIRUB SERPL-MCNC: 0.3 MG/DL (ref 0–1.2)
BUN SERPL-MCNC: 8 MG/DL (ref 8–23)
BUN/CREAT SERPL: 12.5 (ref 7–25)
CALCIUM SPEC-SCNC: 9.7 MG/DL (ref 8.6–10.5)
CHLORIDE SERPL-SCNC: 98 MMOL/L (ref 98–107)
CO2 SERPL-SCNC: 27 MMOL/L (ref 22–29)
CREAT SERPL-MCNC: 0.64 MG/DL (ref 0.76–1.27)
DEPRECATED RDW RBC AUTO: 49.1 FL (ref 37–54)
EOSINOPHIL # BLD AUTO: 0.23 10*3/MM3 (ref 0–0.4)
EOSINOPHIL NFR BLD AUTO: 3.7 % (ref 0.3–6.2)
ERYTHROCYTE [DISTWIDTH] IN BLOOD BY AUTOMATED COUNT: 14.2 % (ref 12.3–15.4)
GFR SERPL CREATININE-BSD FRML MDRD: 126 ML/MIN/1.73
GLOBULIN UR ELPH-MCNC: 2.6 GM/DL
GLUCOSE SERPL-MCNC: 92 MG/DL (ref 65–99)
HCT VFR BLD AUTO: 40.9 % (ref 37.5–51)
HGB BLD-MCNC: 13.6 G/DL (ref 13–17.7)
IMM GRANULOCYTES # BLD AUTO: 0.01 10*3/MM3 (ref 0–0.05)
IMM GRANULOCYTES NFR BLD AUTO: 0.2 % (ref 0–0.5)
LYMPHOCYTES # BLD AUTO: 1.47 10*3/MM3 (ref 0.7–3.1)
LYMPHOCYTES NFR BLD AUTO: 23.9 % (ref 19.6–45.3)
MCH RBC QN AUTO: 30.8 PG (ref 26.6–33)
MCHC RBC AUTO-ENTMCNC: 33.3 G/DL (ref 31.5–35.7)
MCV RBC AUTO: 92.5 FL (ref 79–97)
MONOCYTES # BLD AUTO: 0.64 10*3/MM3 (ref 0.1–0.9)
MONOCYTES NFR BLD AUTO: 10.4 % (ref 5–12)
NEUTROPHILS NFR BLD AUTO: 3.77 10*3/MM3 (ref 1.7–7)
NEUTROPHILS NFR BLD AUTO: 61.3 % (ref 42.7–76)
PLATELET # BLD AUTO: 273 10*3/MM3 (ref 140–450)
PMV BLD AUTO: 8 FL (ref 6–12)
POTASSIUM SERPL-SCNC: 4.4 MMOL/L (ref 3.5–5.2)
PROT SERPL-MCNC: 7 G/DL (ref 6–8.5)
RBC # BLD AUTO: 4.42 10*6/MM3 (ref 4.14–5.8)
SODIUM SERPL-SCNC: 134 MMOL/L (ref 136–145)
WBC # BLD AUTO: 6.15 10*3/MM3 (ref 3.4–10.8)

## 2020-12-10 PROCEDURE — 80053 COMPREHEN METABOLIC PANEL: CPT | Performed by: INTERNAL MEDICINE

## 2020-12-10 PROCEDURE — 85025 COMPLETE CBC W/AUTO DIFF WBC: CPT | Performed by: INTERNAL MEDICINE

## 2020-12-14 ENCOUNTER — OFFICE VISIT (OUTPATIENT)
Dept: ONCOLOGY | Facility: CLINIC | Age: 65
End: 2020-12-14

## 2020-12-14 VITALS
HEART RATE: 70 BPM | WEIGHT: 147.3 LBS | DIASTOLIC BLOOD PRESSURE: 66 MMHG | HEIGHT: 71 IN | TEMPERATURE: 98.2 F | OXYGEN SATURATION: 96 % | SYSTOLIC BLOOD PRESSURE: 138 MMHG | BODY MASS INDEX: 20.62 KG/M2 | RESPIRATION RATE: 18 BRPM

## 2020-12-14 DIAGNOSIS — K86.2 PANCREATIC CYST: Primary | ICD-10-CM

## 2020-12-14 DIAGNOSIS — D50.8 IRON DEFICIENCY ANEMIA SECONDARY TO INADEQUATE DIETARY IRON INTAKE: ICD-10-CM

## 2020-12-14 PROCEDURE — 99214 OFFICE O/P EST MOD 30 MIN: CPT | Performed by: INTERNAL MEDICINE

## 2020-12-14 RX ORDER — FERROUS SULFATE 325(65) MG
325 TABLET ORAL
Qty: 60 TABLET | Refills: 2 | Status: CANCELLED | OUTPATIENT
Start: 2020-12-14

## 2021-03-03 DIAGNOSIS — J44.9 COPD, GROUP C, BY GOLD 2017 CLASSIFICATION (HCC): Primary | ICD-10-CM

## 2021-03-03 NOTE — TELEPHONE ENCOUNTER
An alternative to Spiriva and Symbicort is Breztri.  Please approve script for this to be sent to [facility].

## 2021-06-09 ENCOUNTER — TELEPHONE (OUTPATIENT)
Dept: VASCULAR SURGERY | Facility: CLINIC | Age: 66
End: 2021-06-09

## 2021-06-11 ENCOUNTER — TELEPHONE (OUTPATIENT)
Dept: VASCULAR SURGERY | Facility: CLINIC | Age: 66
End: 2021-06-11

## 2021-06-14 ENCOUNTER — HOSPITAL ENCOUNTER (OUTPATIENT)
Dept: ULTRASOUND IMAGING | Facility: HOSPITAL | Age: 66
Discharge: HOME | End: 2021-06-14

## 2021-06-14 ENCOUNTER — APPOINTMENT (OUTPATIENT)
Dept: ULTRASOUND IMAGING | Facility: HOSPITAL | Age: 66
End: 2021-06-14

## 2021-06-14 ENCOUNTER — OFFICE VISIT (OUTPATIENT)
Dept: VASCULAR SURGERY | Facility: CLINIC | Age: 66
End: 2021-06-14

## 2021-06-14 VITALS
BODY MASS INDEX: 20.58 KG/M2 | HEART RATE: 65 BPM | WEIGHT: 147 LBS | SYSTOLIC BLOOD PRESSURE: 130 MMHG | HEIGHT: 71 IN | OXYGEN SATURATION: 97 % | DIASTOLIC BLOOD PRESSURE: 78 MMHG

## 2021-06-14 DIAGNOSIS — S91.109A OPEN WOUND OF TOE, INITIAL ENCOUNTER: ICD-10-CM

## 2021-06-14 DIAGNOSIS — I73.9 PAD (PERIPHERAL ARTERY DISEASE) (HCC): ICD-10-CM

## 2021-06-14 DIAGNOSIS — I10 ESSENTIAL HYPERTENSION: ICD-10-CM

## 2021-06-14 DIAGNOSIS — I65.23 BILATERAL CAROTID ARTERY STENOSIS: ICD-10-CM

## 2021-06-14 DIAGNOSIS — I77.811 AORTIC ECTASIA, ABDOMINAL (HCC): ICD-10-CM

## 2021-06-14 DIAGNOSIS — I73.9 PAD (PERIPHERAL ARTERY DISEASE) (HCC): Primary | ICD-10-CM

## 2021-06-14 PROCEDURE — 99214 OFFICE O/P EST MOD 30 MIN: CPT | Performed by: NURSE PRACTITIONER

## 2021-06-14 PROCEDURE — 93880 EXTRACRANIAL BILAT STUDY: CPT | Performed by: SURGERY

## 2021-06-14 PROCEDURE — 93923 UPR/LXTR ART STDY 3+ LVLS: CPT

## 2021-06-14 PROCEDURE — 93880 EXTRACRANIAL BILAT STUDY: CPT

## 2021-06-14 PROCEDURE — 93923 UPR/LXTR ART STDY 3+ LVLS: CPT | Performed by: SURGERY

## 2021-06-14 PROCEDURE — 76775 US EXAM ABDO BACK WALL LIM: CPT

## 2021-06-14 NOTE — PROGRESS NOTES
Psychiatric - PODIATRY    Today's Date: 06/15/21    Patient Name: Virgil Knight  MRN: [id number]  CSN: [id number]  PCP: Juli Ackerman APRN  Referring Provider: No ref. provider found    SUBJECTIVE     Chief Complaint   Patient presents with   • Establish Care     right toe wound (saw My on 06/14/2021)- pt states not diabetic. Has a wound on right pinky toe. The 4th toe is rubbing/overlaps some on top of pinky toe.- pt pain 7/10- pt presents with sore inbetween 5th adn 4th toes     HPI: Virgil Knight, a 66 y.o.male, comes to clinic as a(n) new patient complaining of painful lesion to toe. Patient has h/o COPD, HTN, Tobacco Use, Seizure, Stroke. Patient was at his vascular surgery appt yesterday and complaining of right 5th toe pain. He has a small lesion on the toe that is concerning and painful. Denies redness or drainage. Has pain when pressed or wearing shoes. He takes plavix. Notes difficulty walking since having a stroke with right sided weakness. He had PT but continues with issues. Relates that his right foot occasionally drags. Admits pain at 7/10 level and described as shooting, aching and sharp. Denies previous treatment. Denies any constitutional symptoms. No other pedal complaints at this time.    Past Medical History:   Diagnosis Date   • Colon polyps    • COPD, group C, by GOLD 2017 classification (CMS/HCC) 11/14/2019   • Hiatal hernia    • Hypertension    • Personal history of nicotine dependence 11/14/2019   • Seizure disorder (CMS/HCC)    • Stroke (CMS/HCC)      Past Surgical History:   Procedure Laterality Date   • COLONOSCOPY  04/22/2011    ADENOMATOUS/HYPERPLASTIC POLYP   • COLONOSCOPY N/A 12/12/2016    Hyperplastic polyp ascending colon, Diverticulosis repeat exam in 5 years   • ENDOSCOPY  09/05/2014       • ENDOSCOPY N/A 6/5/2020    Procedure: ESOPHAGOGASTRODUODENOSCOPY WITH ANESTHESIA;  Surgeon: Chandra Ordaz MD;  Location: [facility] ENDOSCOPY;  Service:  Gastroenterology;  Laterality: N/A;  preop; abdominal pain  postop; normal   PCP none    • HIP FRACTURE SURGERY     • SHOULDER SURGERY Bilateral    • WRIST SURGERY Right 2013     Family History   Problem Relation Age of Onset   • Colon cancer Other    • Colon polyps Other      Social History     Socioeconomic History   • Marital status:      Spouse name: Not on file   • Number of children: Not on file   • Years of education: Not on file   • Highest education level: Not on file   Tobacco Use   • Smoking status: Current Every Day Smoker     Packs/day: 0.50     Years: 30.00     Pack years: 15.00     Types: Cigarettes     Start date: 1976   • Smokeless tobacco: Never Used   Vaping Use   • Vaping Use: Never used   Substance and Sexual Activity   • Alcohol use: No   • Drug use: No   • Sexual activity: Defer     Allergies   Allergen Reactions   • Amoxicillin-Pot Clavulanate Rash   • Codeine Nausea And Vomiting     Current Outpatient Medications   Medication Sig Dispense Refill   • amLODIPine (NORVASC) 10 MG tablet Take 10 mg by mouth.     • Aspirin Buf,PnIjd-PlMdv-DcUts, (ASCRIPTIN) 325 MG tablet Take 325 mg by mouth.     • Budeson-Glycopyrrol-Formoterol (Breztri Aerosphere) 160-9-4.8 MCG/ACT aerosol inhaler Inhale 2 puffs 2 (Two) Times a Day. 32.1 g 3   • citalopram (CeleXA) 20 MG tablet Take 20 mg by mouth daily.     • divalproex (DEPAKOTE) 500 MG DR tablet Take 500 mg by mouth daily.     • lamoTRIgine (LaMICtal) 200 MG tablet Take 200 mg by mouth Daily.     • lisinopril (PRINIVIL,ZESTRIL) 40 MG tablet Take 40 mg by mouth Daily.     • metoprolol tartrate (LOPRESSOR) 25 MG tablet Take 25 mg by mouth daily. 1/2     • pantoprazole (PROTONIX) 40 MG EC tablet Take 40 mg by mouth daily.       No current facility-administered medications for this visit.     Review of Systems   Constitutional: Negative for chills and fever.   HENT: Negative for congestion.    Respiratory: Negative for shortness of breath.     Cardiovascular: Negative for chest pain and leg swelling.   Gastrointestinal: Negative for constipation, diarrhea, nausea and vomiting.   Musculoskeletal: Positive for arthralgias and gait problem.        Foot pain   Skin: Negative for wound.   Neurological: Positive for weakness. Negative for numbness.       OBJECTIVE     Vitals:    06/15/21 0955   BP: 148/68   Pulse: 54   SpO2: 98%       PHYSICAL EXAM  GEN:   Accompanied by wife.     Foot/Ankle Exam:       General:   Appearance: appears stated age and healthy    Orientation: AAOx3    Affect: appropriate    Gait: unimpaired    Assistance: independent    Shoe Gear:  Casual shoes    VASCULAR      Right Foot Vascularity   Dorsalis pedis:  2+  Posterior tibial:  2+  Skin Temperature: warm    Edema Grading:  None  CFT:  3  Pedal Hair Growth:  Present  Varicosities: mild varicosities       Left Foot Vascularity   Dorsalis pedis:  2+  Posterior tibial:  2+  Skin Temperature: warm    Edema Grading:  None  CFT:  3  Pedal Hair Growth:  Present  Varicosities: mild varicosities        NEUROLOGIC     Right Foot Neurologic   Light touch sensation:  Diminished  Vibratory sensation:  Diminished  Hot/Cold sensation: diminished    Protective Sensation using [place]-Robert Monofilament:  10     Left Foot Neurologic   Light touch sensation:  Diminished  Vibratory sensation:  Normal  Hot/cold sensation: normal    Protective Sensation using [place]Robert Monofilament:  10     MUSCULOSKELETAL      Right Foot Musculoskeletal   Ecchymosis:  None  Tenderness: right foot callus and toe 5    Arch:  Normal     Left Foot Musculoskeletal   Ecchymosis:  None  Tenderness: none    Arch:  Normal     MUSCLE STRENGTH     Right Foot Muscle Strength   Foot dorsiflexion:  4  Foot plantar flexion:  5  Foot inversion:  5  Foot eversion:  3     Left Foot Muscle Strength   Foot dorsiflexion:  5  Foot plantar flexion:  5  Foot inversion:  5  Foot eversion:  5     RANGE OF MOTION      Right Foot Range  of Motion   Foot and ankle ROM within normal limits       Left Foot Range of Motion   Foot and ankle ROM within normal limits       DERMATOLOGIC     Right Foot Dermatologic   Skin: corn (medial 5th toe)       Left Foot Dermatologic   Skin: skin intact        RADIOLOGY/NUCLEAR:  US Aorta Limited    Result Date: 6/14/2021  Narrative: HISTORY: Abdominal aortic aneurysm  Abdominal aortic ultrasound: Transabdominal imaging of the aorta performed.  COMPARISON: CT abdomen pelvis 5/20/2020  FINDINGS: The proximal abdominal aorta measures 2.2 x 2.2 cm. Mid aorta measures 1.8 x 2.3 cm. The distal aorta measures 2.2 x 2.3 cm. Common iliac arteries measure less than 1 cm. Aorta appears patent with atherosclerotic change.      Impression: 1. Mild ectasia of the distal aorta to maximal measurement of 2.3 cm. No aneurysm identified. This report was finalized on 06/14/2021 09:14 by Dr. Annie Joe MD.      LABORATORY/CULTURE RESULTS:      PATHOLOGY RESULTS:       ASSESSMENT/PLAN     Diagnoses and all orders for this visit:    1. Foot callus (Primary)    2. Hammer toe of right foot    3. Foot pain, right    4. Antiplatelet or antithrombotic long-term use    5. Right sided weakness    6. Tobacco abuse      Comprehensive lower extremity examination and evaluation was performed.  Discussed findings and treatment plan including risks, benefits, and treatment options with patient in detail. Patient agreed with treatment plan.  After verbal consent obtained, calluses x1 pared utilizing dermal curette and/or scalpel without incidence  Patient may maintain nails and calluses at home utilizing emery board or pumice stone between visits as needed   Dispensed toe spacer. To keep between toes to decrease rubbing.  Rx: Dorsiflexion assist AFO for RLE  An After Visit Summary was printed and given to the patient at discharge, including (if requested) any available informative/educational handouts regarding diagnosis, treatment, or medications.  All questions were answered to patient/family satisfaction. Should symptoms fail to improve or worsen they agree to call or return to clinic or to go to the Emergency Department. Discussed the importance of following up with any needed screening tests/labs/specialist appointments and any requested follow-up recommended by me today. Importance of maintaining follow-up discussed and patient accepts that missed appointments can delay diagnosis and potentially lead to worsening of conditions.  Return in about 6 months (around 12/15/2021)., or sooner if acute issues arise.    Lab Frequency Next Occurrence   MRI Abdomen With Contrast Once 08/15/2020   CBC & Differential Once 06/14/2021   Comprehensive Metabolic Panel Once 06/14/2021   Ferritin Once 06/14/2021   Iron Profile Once 06/14/2021   MRI abdomen w wo contrast mrcp Once 08/16/2021       This document has been electronically signed by Qasim Cedeño DPM on Mayra 15, 2021 10:33 CDT

## 2021-06-14 NOTE — PROGRESS NOTES
6/14/2021       Juli Ackerman APRN   [address] [facility] [place] [address]    Virgil Knight  1955    Chief Complaint   Patient presents with   • Follow-up     1 Year Follow Up For Peripheral Artery Disease and Bilateral Carotid Artery Stenosis. Test [id number] US pad ankle / brach ind ext comp and US pad carotid bilateral. Patient denies any stroke like symptoms.    • Smoker     Patient is a Current Everyday Smoker    • other     patient states on the Right Foot he has a blister/callus in between the last two toes that is causing him a lot of problems.    • Med Management     Verbally verified medications with patient/wife        Dear Juli Ackerman APRN   HPI  I had the pleasure of seeing your patient Virgil Knight in the office today.    As you recall, Virgil Knight is a 66 y.o.  male who you are currently following for routine health maintenance.  He was initially sent over by gastroenterology for findings of  ectasia of aorta and iliac arteries with plaque.  He has a history of stroke and chronic numbness to his right arm and right foot turned inward with some gait difficulty.  He also complains of pain and numbness in his right leg.  Unfortunately, he is 1/2 pack/day smoker.  He is maintained on full dose aspirin.  He has a small wound present to his right fifth toe.  When he walks this fourth toe is putting pressure on this.  He did have noninvasive testing performed today, which I did review in office.        Review of Systems   Constitutional: Negative.    HENT: Negative.    Eyes: Negative.    Respiratory: Positive for shortness of breath.    Cardiovascular: Negative.    Gastrointestinal: Negative.    Endocrine: Negative.    Genitourinary: Negative.    Musculoskeletal: Positive for arthralgias, back pain and gait problem.   Skin: Positive for wound (5th toe).   Allergic/Immunologic: Negative.    Neurological: Positive for numbness.   Hematological: Negative.   "  Psychiatric/Behavioral: Negative.    All other systems reviewed and are negative.     /78 (BP Location: Left arm, Patient Position: Sitting, Cuff Size: Adult)   Pulse 65   Ht 180.3 cm (71\")   Wt 66.7 kg (147 lb)   SpO2 97%   BMI 20.50 kg/m²   Physical Exam  Vitals and nursing note reviewed.   Constitutional:       General: He is not in acute distress.     Appearance: Normal appearance. He is well-developed and normal weight. He is not diaphoretic.   HENT:      Head: Normocephalic and atraumatic.   Neck:      Vascular: No carotid bruit or JVD.   Cardiovascular:      Rate and Rhythm: Normal rate and regular rhythm.      Pulses: Normal pulses.           Femoral pulses are 2+ on the right side and 2+ on the left side.       Popliteal pulses are 2+ on the right side and 2+ on the left side.        Dorsalis pedis pulses are 2+ on the right side and 2+ on the left side.        Posterior tibial pulses are 2+ on the right side and 2+ on the left side.      Heart sounds: Normal heart sounds, S1 normal and S2 normal. No murmur heard.   No friction rub. No gallop.       Comments: Palpable pulses  Pulmonary:      Effort: Pulmonary effort is normal.      Breath sounds: Normal breath sounds.   Abdominal:      General: Bowel sounds are normal. There is no abdominal bruit.      Palpations: Abdomen is soft.      Tenderness: There is no abdominal tenderness.   Musculoskeletal:        Feet:       Comments: Right foot turns inward since previous stroke   Skin:     General: Skin is warm and dry.   Neurological:      Mental Status: He is alert and oriented to person, place, and time.      Cranial Nerves: No cranial nerve deficit.      Gait: Gait abnormal (Due to previous stroke).   Psychiatric:         Behavior: Behavior normal.         Thought Content: Thought content normal.         Judgment: Judgment normal.          Diagnostic data:    Noninvasive testing including ABIs show right TYE of 1.06 and a left TYE 1.11.  Carotid " duplex shows less than 50% carotid stenosis bilaterally.    US Aorta Limited    Result Date: 6/14/2021  Narrative: HISTORY: Abdominal aortic aneurysm  Abdominal aortic ultrasound: Transabdominal imaging of the aorta performed.  COMPARISON: CT abdomen pelvis 5/20/2020  FINDINGS: The proximal abdominal aorta measures 2.2 x 2.2 cm. Mid aorta measures 1.8 x 2.3 cm. The distal aorta measures 2.2 x 2.3 cm. Common iliac arteries measure less than 1 cm. Aorta appears patent with atherosclerotic change.      Impression: 1. Mild ectasia of the distal aorta to maximal measurement of 2.3 cm. No aneurysm identified. This report was finalized on 06/14/2021 09:14 by Dr. Annie Joe MD.     Patient Active Problem List   Diagnosis   • History of colon polyps   • Platelet inhibition due to Plavix   • Apical lung scarring   • Chronic back pain   • Centrilobular emphysema (CMS/HCC)   • Personal history of nicotine dependence   • COPD, group C, by GOLD 2017 classification (CMS/HCC)   • Abdominal pain, epigastric   • Hypertension   • Iron deficiency anemia secondary to inadequate dietary iron intake        Diagnosis Plan   1. PAD (peripheral artery disease) (CMS/HCC)  US Ankle / Brachial Indices Extremity Complete   2. Bilateral carotid artery stenosis  US Carotid Bilateral   3. Essential hypertension     4. Open wound of toe, initial encounter  Ambulatory Referral to Podiatry       Plan: After thoroughly evaluating Virgil Knight, I believe the best course of action is to remain conservative from vascular surgery standpoint.  I did review his testing which shows ABIs within normal limits.  His carotid duplex shows less than 50% carotid stenosis bilaterally.  Ultrasound of the aorta showed mild ectasia of the aorta measuring 2.3 cm.   He does have a small little wound to his right fifth toe fourth toe does overlap when he is standing likely pressure from this.  His foot does turn in slightly due to previous stroke.  I will refer  to podiatry.  We will see him back in 1 year with repeat noninvasive testing for continued surveillance, including ABIs and a carotid duplex.   I did discuss vascular risk factors as they pertain to the progression of vascular disease including controlling hypertension and tobacco abuse.  His blood pressure stable on his current medication regimen.  Unfortunately he does continue to smoke half a pack of cigarettes per day despite continued risks.  He is not interested in smoking cessation at this time.  The patient is to continue taking their medications as previously discussed.   This was all discussed in full with complete understanding.  Thank you for allowing me to participate in the care of your patient.  Please do not hesitate to call with any questions or concerns.  We will keep you aware of any further encounters with Virgil Knight.        Sincerely yours,         ELMER Buckley Allie, APRN

## 2021-06-15 ENCOUNTER — OFFICE VISIT (OUTPATIENT)
Dept: PODIATRY | Facility: CLINIC | Age: 66
End: 2021-06-15

## 2021-06-15 VITALS
HEART RATE: 54 BPM | DIASTOLIC BLOOD PRESSURE: 68 MMHG | OXYGEN SATURATION: 98 % | HEIGHT: 71 IN | WEIGHT: 143.2 LBS | SYSTOLIC BLOOD PRESSURE: 148 MMHG | BODY MASS INDEX: 20.05 KG/M2

## 2021-06-15 DIAGNOSIS — R53.1 RIGHT SIDED WEAKNESS: ICD-10-CM

## 2021-06-15 DIAGNOSIS — L84 FOOT CALLUS: Primary | ICD-10-CM

## 2021-06-15 DIAGNOSIS — M79.671 FOOT PAIN, RIGHT: ICD-10-CM

## 2021-06-15 DIAGNOSIS — M20.41 HAMMER TOE OF RIGHT FOOT: ICD-10-CM

## 2021-06-15 DIAGNOSIS — Z72.0 TOBACCO ABUSE: ICD-10-CM

## 2021-06-15 DIAGNOSIS — Z79.02 ANTIPLATELET OR ANTITHROMBOTIC LONG-TERM USE: ICD-10-CM

## 2021-06-15 PROCEDURE — 11055 PARING/CUTG B9 HYPRKER LES 1: CPT | Performed by: PODIATRIST

## 2021-06-15 PROCEDURE — 99203 OFFICE O/P NEW LOW 30 MIN: CPT | Performed by: PODIATRIST

## 2021-06-15 NOTE — PATIENT INSTRUCTIONS
Corns and Calluses  Corns are small areas of thickened skin that occur on the top, sides, or tip of a toe. They contain a cone-shaped core with a point that can press on a nerve below. This causes pain.   Calluses are areas of thickened skin that can occur anywhere on the body, including the hands, fingers, palms, soles of the feet, and heels. Calluses are usually larger than corns.  What are the causes?  Corns and calluses are caused by rubbing (friction) or pressure, such as from shoes that are too tight or do not fit properly.  What increases the risk?  Corns are more likely to develop in people who have misshapen toes (toe deformities), such as hammer toes.  Calluses can occur with friction to any area of the skin. They are more likely to develop in people who:  · Work with their hands.  · Wear shoes that fit poorly, are too tight, or are high-heeled.  · Have toe deformities.  What are the signs or symptoms?  Symptoms of a corn or callus include:  · A hard growth on the skin.  · Pain or tenderness under the skin.  · Redness and swelling.  · Increased discomfort while wearing tight-fitting shoes, if your feet are affected.  If a corn or callus becomes infected, symptoms may include:  · Redness and swelling that gets worse.  · Pain.  · Fluid, blood, or pus draining from the corn or callus.  How is this diagnosed?  Corns and calluses may be diagnosed based on your symptoms, your medical history, and a physical exam.  How is this treated?  Treatment for corns and calluses may include:  · Removing the cause of the friction or pressure. This may involve:  ? Changing your shoes.  ? Wearing shoe inserts (orthotics) or other protective layers in your shoes, such as a corn pad.  ? Wearing gloves.  · Applying medicine to the skin (topical medicine) to help soften skin in the hardened, thickened areas.  · Removing layers of dead skin with a file to reduce the size of the corn or callus.  · Removing the corn or callus with a  scalpel or laser.  · Taking antibiotic medicines, if your corn or callus is infected.  · Having surgery, if a toe deformity is the cause.  Follow these instructions at home:    · Take over-the-counter and prescription medicines only as told by your health care provider.  · If you were prescribed an antibiotic, take it as told by your health care provider. Do not stop taking it even if your condition starts to improve.  · Wear shoes that fit well. Avoid wearing high-heeled shoes and shoes that are too tight or too loose.  · Wear any padding, protective layers, gloves, or orthotics as told by your health care provider.  · Soak your hands or feet and then use a file or pumice stone to soften your corn or callus. Do this as told by your health care provider.  · Check your corn or callus every day for symptoms of infection.  Contact a health care provider if you:  · Notice that your symptoms do not improve with treatment.  · Have redness or swelling that gets worse.  · Notice that your corn or callus becomes painful.  · Have fluid, blood, or pus coming from your corn or callus.  · Have new symptoms.  Summary  · Corns are small areas of thickened skin that occur on the top, sides, or tip of a toe.  · Calluses are areas of thickened skin that can occur anywhere on the body, including the hands, fingers, palms, and soles of the feet. Calluses are usually larger than corns.  · Corns and calluses are caused by rubbing (friction) or pressure, such as from shoes that are too tight or do not fit properly.  · Treatment may include wearing any padding, protective layers, gloves, or orthotics as told by your health care provider.  This information is not intended to replace advice given to you by your health care provider. Make sure you discuss any questions you have with your health care provider.  Document Revised: 04/08/2020 Document Reviewed: 10/31/2018  Elsevier Patient Education © 2021 [employer]

## 2021-06-16 ENCOUNTER — TELEPHONE (OUTPATIENT)
Dept: ONCOLOGY | Facility: CLINIC | Age: 66
End: 2021-06-16

## 2021-06-16 NOTE — TELEPHONE ENCOUNTER
Caller: Virgil Knight    Relationship to patient: Self    Best call back number: [phone]    Chief complaint:  PT WANTS TO CANCEL APPT, THEY SAY THEY'LL RESCHEDULE LATER    Type of visit: FOLLOW UP    Requested date:     If rescheduling, when is the original appointment: 06/21

## 2021-08-02 ENCOUNTER — TELEPHONE (OUTPATIENT)
Dept: PULMONOLOGY | Facility: CLINIC | Age: 66
End: 2021-08-02

## 2021-08-02 DIAGNOSIS — J44.9 COPD, GROUP C, BY GOLD 2017 CLASSIFICATION (HCC): ICD-10-CM

## 2021-08-02 RX ORDER — BUDESONIDE, GLYCOPYRROLATE, AND FORMOTEROL FUMARATE 160; 9; 4.8 UG/1; UG/1; UG/1
2 AEROSOL, METERED RESPIRATORY (INHALATION) 2 TIMES DAILY
Qty: 32.1 G | Refills: 3 | Status: SHIPPED | OUTPATIENT
Start: 2021-08-02 | End: 2021-12-01 | Stop reason: SDUPTHER

## 2021-08-02 NOTE — TELEPHONE ENCOUNTER
They are needing a script for Breztri to be faxed to them at [phone].      Please sign the script    I put it on your desk.

## 2021-09-08 ENCOUNTER — TRANSCRIBE ORDERS (OUTPATIENT)
Dept: ADMINISTRATIVE | Facility: HOSPITAL | Age: 66
End: 2021-09-08

## 2021-09-08 DIAGNOSIS — M54.16 LUMBAR RADICULOPATHY: Primary | ICD-10-CM

## 2021-09-21 ENCOUNTER — HOSPITAL ENCOUNTER (OUTPATIENT)
Dept: MRI IMAGING | Facility: HOSPITAL | Age: 66
Discharge: HOME | End: 2021-09-21
Admitting: PHYSICIAN ASSISTANT

## 2021-09-21 DIAGNOSIS — M54.16 LUMBAR RADICULOPATHY: ICD-10-CM

## 2021-09-21 PROCEDURE — 72148 MRI LUMBAR SPINE W/O DYE: CPT

## 2021-11-10 PROBLEM — Z87.891 PERSONAL HISTORY OF NICOTINE DEPENDENCE: Chronic | Status: ACTIVE | Noted: 2019-11-14

## 2021-11-10 PROBLEM — J98.4 APICAL LUNG SCARRING: Chronic | Status: ACTIVE | Noted: 2018-11-20

## 2021-11-10 PROBLEM — J43.2 CENTRILOBULAR EMPHYSEMA (HCC): Chronic | Status: ACTIVE | Noted: 2018-11-20

## 2021-11-10 PROBLEM — J44.9 COPD, GROUP C, BY GOLD 2017 CLASSIFICATION (HCC): Chronic | Status: ACTIVE | Noted: 2019-11-14

## 2021-11-22 ENCOUNTER — OFFICE VISIT (OUTPATIENT)
Dept: GASTROENTEROLOGY | Facility: CLINIC | Age: 66
End: 2021-11-22

## 2021-11-22 VITALS
SYSTOLIC BLOOD PRESSURE: 122 MMHG | OXYGEN SATURATION: 97 % | HEIGHT: 71 IN | DIASTOLIC BLOOD PRESSURE: 72 MMHG | BODY MASS INDEX: 20.02 KG/M2 | WEIGHT: 143 LBS | HEART RATE: 91 BPM | TEMPERATURE: 97.2 F

## 2021-11-22 DIAGNOSIS — Z71.6 TOBACCO ABUSE COUNSELING: ICD-10-CM

## 2021-11-22 DIAGNOSIS — I10 HTN (HYPERTENSION), BENIGN: ICD-10-CM

## 2021-11-22 DIAGNOSIS — Z86.010 HX OF COLONIC POLYPS: Primary | ICD-10-CM

## 2021-11-22 PROCEDURE — 99214 OFFICE O/P EST MOD 30 MIN: CPT | Performed by: CLINICAL NURSE SPECIALIST

## 2021-11-22 NOTE — PROGRESS NOTES
Virgil Knight  1955 11/22/2021  Chief Complaint   Patient presents with   • Colonoscopy     Subjective   HPI  Virgil Knight is a 66 y.o. male who presents as a referral for preventative maintenance. He has no complaints of nausea or vomiting. No change in bowels. No wt loss. No BRBPR. No melena. There is positive family hx for colon cancer in 1 uncle. No abdominal pain.  Past Medical History:   Diagnosis Date   • Colon polyps    • COPD, group C, by GOLD 2017 classification ([facility]) 11/14/2019   • Hiatal hernia    • Hypertension    • Personal history of nicotine dependence 11/14/2019   • Seizure disorder ([facility])    • Stroke ([facility])      Past Surgical History:   Procedure Laterality Date   • COLONOSCOPY  04/22/2011    ADENOMATOUS/HYPERPLASTIC POLYP   • COLONOSCOPY N/A 12/12/2016    Hyperplastic polyp ascending colon, Diverticulosis repeat exam in 5 years   • ENDOSCOPY  09/05/2014    [facility]   • ENDOSCOPY N/A 6/5/2020    Normal exam   • HIP FRACTURE SURGERY     • SHOULDER SURGERY Bilateral    • WRIST SURGERY Right 2013     Outpatient Medications Marked as Taking for the 11/22/21 encounter (Office Visit) with Annie Klein APRN   Medication Sig Dispense Refill   • amLODIPine (NORVASC) 10 MG tablet Take 10 mg by mouth.     • Aspirin Buf,ZfCla-YjRso-MmDjk, (ASCRIPTIN) 325 MG tablet Take 325 mg by mouth.     • Budeson-Glycopyrrol-Formoterol (Breztri Aerosphere) 160-9-4.8 MCG/ACT aerosol inhaler Inhale 2 puffs 2 (Two) Times a Day. 32.1 g 3   • citalopram (CeleXA) 20 MG tablet Take 20 mg by mouth daily.     • divalproex (DEPAKOTE) 500 MG DR tablet Take 500 mg by mouth daily.     • lamoTRIgine (LaMICtal) 200 MG tablet Take 200 mg by mouth Daily.     • lisinopril (PRINIVIL,ZESTRIL) 40 MG tablet Take 40 mg by mouth Daily.     • metoprolol tartrate (LOPRESSOR) 25 MG tablet Take 25 mg by mouth daily. 1/2     • pantoprazole (PROTONIX) 40 MG EC tablet Take 40 mg by mouth daily.       Allergies   Allergen Reactions   •  "Amoxicillin-Pot Clavulanate Rash   • Codeine Nausea And Vomiting     Social History     Socioeconomic History   • Marital status:    Tobacco Use   • Smoking status: Current Every Day Smoker     Packs/day: 0.50     Years: 30.00     Pack years: 15.00     Types: Cigarettes     Start date: 1976   • Smokeless tobacco: Never Used   Vaping Use   • Vaping Use: Never used   Substance and Sexual Activity   • Alcohol use: No   • Drug use: No   • Sexual activity: Defer     Family History   Problem Relation Age of Onset   • Colon cancer Other    • Colon polyps Other      Health Maintenance   Topic Date Due   • Pneumococcal Vaccine 65+ (1 of 2 - PPSV23) Never done   • COVID-19 Vaccine (1) Never done   • TDAP/TD VACCINES (1 - Tdap) Never done   • ZOSTER VACCINE (1 of 2) Never done   • HEPATITIS C SCREENING  Never done   • ANNUAL WELLNESS VISIT  Never done   • INFLUENZA VACCINE  08/01/2021   • COLORECTAL CANCER SCREENING  12/12/2026       REVIEW OF SYSTEMS  General: well appearing, no fever chills or sweats, no unexplained wt loss  HEENT: no acute visual or hearing disturbances  Cardiovascular: No chest pain or palpitations  Pulmonary: No shortness of breath, coughing, wheezing or hemoptysis  : No burning, urgency, hematuria, or dysuria  Musculoskeletal: No joint pain or stiffness  Peripheral: no edema  Skin: No lesions or rashes  Neuro: No dizziness, headaches, stroke, syncope  Endocrine: No hot or cold intolerances  Hematological: No blood dyscrasias    Objective   Vitals:    11/22/21 1306   BP: 122/72   Pulse: 91   Temp: 97.2 °F (36.2 °C)   SpO2: 97%   Weight: 64.9 kg (143 lb)   Height: 180.3 cm (71\")     Body mass index is 19.94 kg/m².  Patient's Body mass index is 19.94 kg/m². indicating that he is within normal range (BMI 18.5-24.9). No BMI management plan needed..      PHYSICAL EXAM  General: age appropriate well nourished well appearing, no acute distress  Head: normocephalic and atraumatic  Global " assessment-supple  Neck-No JVD noted, no lymphadenopathy  Pulmonary-clear to auscultation bilaterally, normal respiratory effort  Cardiovascular-normal rate and rhythm, normal heart sounds, S1 and S2 noted  Abdomen-soft, non tender, non distended, normal bowel sounds all 4 quadrants, no hepatosplenomegaly noted  Extremities-No clubbing cyanosis or edema  Neuro-Non focal, converses appropriately, awake, alert, oriented    Assessment/Plan     Diagnoses and all orders for this visit:    1. Hx of colonic polyps (Primary)  -     Case Request; Standing  -     Follow Anesthesia Guidelines / Standing Orders; Future  -     Obtain Informed Consent; Future  -     Case Request  -     polyethylene glycol (GoLYTELY) 236 g solution; Take as directed by office instructions.  Dispense: 4000 mL; Refill: 0    2. HTN (hypertension), benign  Comments:  cont BP medication the day of procedure    3. Tobacco abuse counseling        COLONOSCOPY WITH ANESTHESIA (N/A)  Body mass index is 19.94 kg/m².    Patient instructions on prep prior to procedure provided to the patient.    All risks, benefits, alternatives, and indications of colonoscopy procedure have been discussed with the patient. Risks to include perforation of the colon requiring possible surgery or colostomy, risk of bleeding from biopsies or removal of colon tissue, possibility of missing a colon polyp or cancer, or adverse drug reaction.  Benefits to include the diagnosis and management of disease of the colon and rectum. Alternatives to include barium enema, radiographic evaluation, lab testing or no intervention. Pt verbalizes understanding and agrees.     Annie Klein, APRN  2021  13:17 CST      IF YOU SMOKE OR USE TOBACCO PLEASE READ THE FOLLOWIN minutes reading provided    Why is smoking bad for me?  Smoking increases the risk of heart disease, lung disease, vascular disease, stroke, and cancer.     If you smoke, STOP!    If you would like more information  on quitting smoking, please visit the [employer] website: [email or web address] Do Now I [email or web address]   This link will provide additional resources including the QUIT line and the Beat the Pack support groups.     For more information:    Quit Now [place]  1-800-QUIT-NOW  https://[place].quitlogix.org/en-[place]/

## 2021-11-29 ENCOUNTER — LAB (OUTPATIENT)
Dept: LAB | Facility: HOSPITAL | Age: 66
End: 2021-11-29

## 2021-11-29 DIAGNOSIS — Z01.812 ENCOUNTER FOR PREPROCEDURE SCREENING LABORATORY TESTING FOR COVID-19: Primary | ICD-10-CM

## 2021-11-29 DIAGNOSIS — Z20.822 ENCOUNTER FOR PREPROCEDURE SCREENING LABORATORY TESTING FOR COVID-19: Primary | ICD-10-CM

## 2021-11-29 LAB — SARS-COV-2 ORF1AB RESP QL NAA+PROBE: NOT DETECTED

## 2021-11-29 PROCEDURE — C9803 HOPD COVID-19 SPEC COLLECT: HCPCS

## 2021-11-29 PROCEDURE — U0004 COV-19 TEST NON-CDC HGH THRU: HCPCS

## 2021-12-01 ENCOUNTER — OFFICE VISIT (OUTPATIENT)
Dept: PULMONOLOGY | Facility: CLINIC | Age: 66
End: 2021-12-01

## 2021-12-01 VITALS
HEART RATE: 67 BPM | SYSTOLIC BLOOD PRESSURE: 138 MMHG | DIASTOLIC BLOOD PRESSURE: 72 MMHG | HEIGHT: 69 IN | WEIGHT: 141.4 LBS | OXYGEN SATURATION: 99 % | BODY MASS INDEX: 20.94 KG/M2

## 2021-12-01 DIAGNOSIS — Z01.812 ENCOUNTER FOR PREOPERATIVE SCREENING LABORATORY TESTING FOR COVID-19 VIRUS: ICD-10-CM

## 2021-12-01 DIAGNOSIS — Z20.822 ENCOUNTER FOR PREOPERATIVE SCREENING LABORATORY TESTING FOR COVID-19 VIRUS: ICD-10-CM

## 2021-12-01 DIAGNOSIS — J98.4 APICAL LUNG SCARRING: Chronic | ICD-10-CM

## 2021-12-01 DIAGNOSIS — J44.9 COPD, GROUP C, BY GOLD 2017 CLASSIFICATION (HCC): Chronic | ICD-10-CM

## 2021-12-01 DIAGNOSIS — Z87.891 PERSONAL HISTORY OF NICOTINE DEPENDENCE: Chronic | ICD-10-CM

## 2021-12-01 DIAGNOSIS — J43.2 CENTRILOBULAR EMPHYSEMA (HCC): Primary | Chronic | ICD-10-CM

## 2021-12-01 PROCEDURE — 94010 BREATHING CAPACITY TEST: CPT | Performed by: NURSE PRACTITIONER

## 2021-12-01 PROCEDURE — 99214 OFFICE O/P EST MOD 30 MIN: CPT | Performed by: NURSE PRACTITIONER

## 2021-12-01 RX ORDER — PREGABALIN 50 MG/1
50 CAPSULE ORAL DAILY
Status: ON HOLD | COMMUNITY
Start: 2021-11-29 | End: 2022-08-19

## 2021-12-01 RX ORDER — ALBUTEROL SULFATE 90 UG/1
2 AEROSOL, METERED RESPIRATORY (INHALATION) EVERY 4 HOURS PRN
Qty: 18 G | Refills: 0 | Status: SHIPPED | OUTPATIENT
Start: 2021-12-01 | End: 2021-12-31

## 2021-12-01 NOTE — PROCEDURES
Pulmonary Function Test  Performed by: Sandra Mccann, RRT  Authorized by: Rubina Hernández APRN      Pre Drug % Predicted    FVC: 75%   FEV1: 30%   FEF 25-75%: 12%   FEV1/FVC: 32.02%    Interpretation   Spirometry   Spirometry shows severe obstruction. There is reduced midflow suggesting small airway/airflow obstruction.

## 2021-12-21 ENCOUNTER — ANESTHESIA (OUTPATIENT)
Dept: GASTROENTEROLOGY | Facility: HOSPITAL | Age: 66
End: 2021-12-21

## 2021-12-21 ENCOUNTER — HOSPITAL ENCOUNTER (OUTPATIENT)
Facility: HOSPITAL | Age: 66
Setting detail: HOSPITAL OUTPATIENT SURGERY
Discharge: HOME | End: 2021-12-21
Attending: INTERNAL MEDICINE | Admitting: INTERNAL MEDICINE

## 2021-12-21 ENCOUNTER — ANESTHESIA EVENT (OUTPATIENT)
Dept: GASTROENTEROLOGY | Facility: HOSPITAL | Age: 66
End: 2021-12-21

## 2021-12-21 VITALS
DIASTOLIC BLOOD PRESSURE: 85 MMHG | SYSTOLIC BLOOD PRESSURE: 139 MMHG | HEART RATE: 59 BPM | BODY MASS INDEX: 19.6 KG/M2 | WEIGHT: 140 LBS | OXYGEN SATURATION: 100 % | TEMPERATURE: 96.9 F | HEIGHT: 71 IN | RESPIRATION RATE: 19 BRPM

## 2021-12-21 DIAGNOSIS — Z86.010 HX OF COLONIC POLYPS: ICD-10-CM

## 2021-12-21 PROCEDURE — 88305 TISSUE EXAM BY PATHOLOGIST: CPT | Performed by: INTERNAL MEDICINE

## 2021-12-21 PROCEDURE — 25010000002 PROPOFOL 10 MG/ML EMULSION: Performed by: NURSE ANESTHETIST, CERTIFIED REGISTERED

## 2021-12-21 PROCEDURE — 45385 COLONOSCOPY W/LESION REMOVAL: CPT | Performed by: INTERNAL MEDICINE

## 2021-12-21 RX ORDER — SODIUM CHLORIDE 0.9 % (FLUSH) 0.9 %
10 SYRINGE (ML) INJECTION AS NEEDED
Status: DISCONTINUED | OUTPATIENT
Start: 2021-12-21 | End: 2021-12-21 | Stop reason: HOSPADM

## 2021-12-21 RX ORDER — SODIUM CHLORIDE 9 MG/ML
500 INJECTION, SOLUTION INTRAVENOUS CONTINUOUS PRN
Status: DISCONTINUED | OUTPATIENT
Start: 2021-12-21 | End: 2021-12-21 | Stop reason: HOSPADM

## 2021-12-21 RX ORDER — PROPOFOL 10 MG/ML
VIAL (ML) INTRAVENOUS AS NEEDED
Status: DISCONTINUED | OUTPATIENT
Start: 2021-12-21 | End: 2021-12-21 | Stop reason: SURG

## 2021-12-21 RX ORDER — LIDOCAINE HYDROCHLORIDE 10 MG/ML
0.5 INJECTION, SOLUTION EPIDURAL; INFILTRATION; INTRACAUDAL; PERINEURAL ONCE AS NEEDED
Status: CANCELLED | OUTPATIENT
Start: 2021-12-21

## 2021-12-21 RX ORDER — LIDOCAINE HYDROCHLORIDE 20 MG/ML
INJECTION, SOLUTION EPIDURAL; INFILTRATION; INTRACAUDAL; PERINEURAL AS NEEDED
Status: DISCONTINUED | OUTPATIENT
Start: 2021-12-21 | End: 2021-12-21 | Stop reason: SURG

## 2021-12-21 RX ADMIN — LIDOCAINE HYDROCHLORIDE 100 MG: 20 INJECTION, SOLUTION EPIDURAL; INFILTRATION; INTRACAUDAL; PERINEURAL at 08:32

## 2021-12-21 RX ADMIN — SODIUM CHLORIDE 500 ML: 9 INJECTION, SOLUTION INTRAVENOUS at 07:28

## 2021-12-21 RX ADMIN — PROPOFOL 400 MG: 10 INJECTION, EMULSION INTRAVENOUS at 08:32

## 2021-12-21 NOTE — ANESTHESIA POSTPROCEDURE EVALUATION
"Patient: Virgil Knight    Procedure Summary     Date: 12/21/21 Room / Location: [facility] ENDOSCOPY 2 / [facility]    Anesthesia Start: 0830 Anesthesia Stop: 0900    Procedure: COLONOSCOPY WITH ANESTHESIA (N/A ) Diagnosis:       Hx of colonic polyps      (Hx of colonic polyps [Z86.010])    Surgeons: Chandra Ordaz MD Provider: Ishmael Quevedo CRNA    Anesthesia Type: MAC ASA Status: 3          Anesthesia Type: MAC    Vitals  Vitals Value Taken Time   BP     Temp     Pulse 59 12/21/21 0900   Resp     SpO2 95 % 12/21/21 0900   Vitals shown include unvalidated device data.        Post Anesthesia Care and Evaluation    Patient location during evaluation: PHASE II  Patient participation: complete - patient participated  Level of consciousness: awake and alert  Pain management: adequate  Airway patency: patent  Anesthetic complications: No anesthetic complications    Cardiovascular status: acceptable  Respiratory status: acceptable  Hydration status: acceptable    Comments: Blood pressure 179/92, pulse 67, temperature 96.9 °F (36.1 °C), temperature source Temporal, resp. rate 20, height 180.3 cm (71\"), weight 63.5 kg (140 lb), SpO2 97 %.    Pt discharged from PACU based on tere score >8      "

## 2021-12-21 NOTE — ANESTHESIA PREPROCEDURE EVALUATION
Anesthesia Evaluation     Patient summary reviewed and Nursing notes reviewed   no history of anesthetic complications:  NPO Solid Status: > 8 hours  NPO Liquid Status: > 2 hours           Airway   Mallampati: II  TM distance: >3 FB  Neck ROM: full  No difficulty expected  Dental    (+) upper dentures and lower dentures    Pulmonary    (+) a smoker Current, COPD,   Cardiovascular   Exercise tolerance: poor (<4 METS)    (+) hypertension,   (-) CAD      Neuro/Psych  (+) seizures well controlled, CVA (right sided weakness) residual symptoms,     GI/Hepatic/Renal/Endo    (+)  hiatal hernia,    (-) liver disease, no renal disease, diabetes    Musculoskeletal     Abdominal    Substance History      OB/GYN          Other                      Anesthesia Plan    ASA 3     MAC     intravenous induction     Anesthetic plan, all risks, benefits, and alternatives have been provided, discussed and informed consent has been obtained with: patient.

## 2021-12-22 LAB
CYTO UR: NORMAL
LAB AP CASE REPORT: NORMAL
PATH REPORT.FINAL DX SPEC: NORMAL
PATH REPORT.GROSS SPEC: NORMAL

## 2022-04-13 ENCOUNTER — TELEPHONE (OUTPATIENT)
Dept: VASCULAR SURGERY | Facility: CLINIC | Age: 67
End: 2022-04-13

## 2022-04-13 NOTE — TELEPHONE ENCOUNTER
LEFT VM MESS FOR PT ABOUT APPT CHANGE TIME AND DATE WITH SHERI FOR 06/08/2022 AND TO KEEP HIS TESTING APPT ON THE ORIGINAL DAY 06/01/2022

## 2022-04-13 NOTE — TELEPHONE ENCOUNTER
Pt called and said that he missed a call from this phone number.  I told him that it looked like Mayra contacted the patient to let him know that My will be out of the office on 6/1/22, but he should keep his testing appt for that date.  His appt in the office was changed to 6/8/22.  Pt said that it looks like he got a VM message, so he was going to check that and would make this change on the calendar.    **Pt's wife called and wanted to be sure that she knew what was going on with her 's appts.  I told her that she would keep the appts for testing on 6/1/22 as scheduled.  His appt in the office was changed to 6/8/22 at 1:00 to go over the test results.  She repeated this information back to me to be sure that she had complete understanding.  I confirmed this information.

## 2022-06-01 ENCOUNTER — HOSPITAL ENCOUNTER (OUTPATIENT)
Dept: ULTRASOUND IMAGING | Facility: HOSPITAL | Age: 67
Discharge: HOME | End: 2022-06-01

## 2022-06-01 DIAGNOSIS — I65.23 BILATERAL CAROTID ARTERY STENOSIS: ICD-10-CM

## 2022-06-01 DIAGNOSIS — I73.9 PAD (PERIPHERAL ARTERY DISEASE): ICD-10-CM

## 2022-06-01 PROCEDURE — 93880 EXTRACRANIAL BILAT STUDY: CPT

## 2022-06-01 PROCEDURE — 93880 EXTRACRANIAL BILAT STUDY: CPT | Performed by: SURGERY

## 2022-06-01 PROCEDURE — 93923 UPR/LXTR ART STDY 3+ LVLS: CPT

## 2022-06-01 PROCEDURE — 93923 UPR/LXTR ART STDY 3+ LVLS: CPT | Performed by: SURGERY

## 2022-06-07 ENCOUNTER — TELEPHONE (OUTPATIENT)
Dept: VASCULAR SURGERY | Facility: CLINIC | Age: 67
End: 2022-06-07

## 2022-06-07 NOTE — TELEPHONE ENCOUNTER
Left message reminding Mr Knight of his appointment for Wednesday, June 8th, 2022 at 1 pm with My PAYNE.

## 2022-06-07 NOTE — PROGRESS NOTES
"  6/8/2022       Juli Ackerman APRN   [address] [facility] [address]    Virgil Knight  1955    Chief Complaint   Patient presents with   • Follow-up     1 Year Follow Up For Bilateral Carotid Artery Stenosis and Peripheral Artery Disease. Test [id number] US pad ankle / brach ind ext comp and US pad carotid bilateral. Patient denies any stroke like symptoms.    • Smoker     Patient is a Current Everyday Smoker    • Med Management     Verbally verified medications with patient/wife        Dear Juli Ackerman APRN   HPI  I had the pleasure of seeing your patient Virgil Knight in the office today.    As you recall, Virgil Knight is a 67 y.o.  male who you are currently following for routine health maintenance.  He was initially sent over by gastroenterology for findings of  ectasia of aorta and iliac arteries with plaque.  He has a history of stroke and chronic numbness to his right arm and right foot turned inward with some gait difficulty.  Unfortunately is a current daily smoker.  He did have noninvasive testing performed, which I did review in office.       Review of Systems   Constitutional: Negative.    HENT: Negative.    Eyes: Negative.    Respiratory: Negative.    Cardiovascular: Negative.    Gastrointestinal: Negative.    Endocrine: Negative.    Genitourinary: Negative.    Musculoskeletal: Positive for arthralgias and back pain.   Skin: Negative.    Allergic/Immunologic: Negative.    Neurological: Positive for numbness.   Hematological: Negative.    Psychiatric/Behavioral: Negative.    All other systems reviewed and are negative.        /72 (BP Location: Left arm, Patient Position: Sitting, Cuff Size: Adult)   Pulse 68   Ht 180.3 cm (71\")   Wt 63.5 kg (140 lb)   SpO2 95%   BMI 19.53 kg/m²   Physical Exam  Vitals and nursing note reviewed.   Constitutional:       Appearance: Normal appearance. He is well-developed.   HENT:      Head: Normocephalic and atraumatic. "   Eyes:      General: No scleral icterus.     Pupils: Pupils are equal, round, and reactive to light.   Neck:      Thyroid: No thyromegaly.   Cardiovascular:      Rate and Rhythm: Normal rate and regular rhythm.      Pulses: Normal pulses.      Heart sounds: Normal heart sounds.   Pulmonary:      Effort: Pulmonary effort is normal.      Breath sounds: Normal breath sounds.   Abdominal:      General: Bowel sounds are normal.      Palpations: Abdomen is soft.   Musculoskeletal:         General: Normal range of motion.      Cervical back: Normal range of motion and neck supple.   Skin:     General: Skin is warm and dry.   Neurological:      General: No focal deficit present.      Mental Status: He is alert and oriented to person, place, and time.   Psychiatric:         Mood and Affect: Mood normal.         Behavior: Behavior normal.         Thought Content: Thought content normal.         Judgment: Judgment normal.           Diagnostic data:      US Carotid Bilateral    Result Date: 6/1/2022  Narrative: History: Carotid occlusive disease      Impression: Impression: 1. There is less than 50% stenosis of the right internal carotid artery. 2. There is less than 50% stenosis of the left internal carotid artery. 3. Antegrade flow is demonstrated in bilateral vertebral arteries.  Comments: Bilateral carotid vertebral arterial duplex scan was performed.  Grayscale imaging shows intimal thickening and calcified elements at the carotid bifurcation. The right internal carotid artery peak systolic velocity is 89.7 cm/sec. The end-diastolic velocity is 14.6 cm/sec. The right ICA/CCA ratio is approximately 1.6 . These findings correlate with less than 50% stenosis of the right internal carotid artery.  Grayscale imaging shows intimal thickening and calcified elements at the carotid bifurcation. The left internal carotid artery peak systolic velocity is 66 cm/sec. The end-diastolic velocity is 17.2 cm/sec. The left ICA/CCA ratio is  approximately 1.3 . These findings correlate with less than 50% stenosis of the left internal carotid artery.  Antegrade flow is demonstrated in bilateral vertebral arteries.  This report was finalized on 06/01/2022 14:19 by Dr. Nikhil Ott MD.    US Ankle / Brachial Indices Extremity Complete    Result Date: 6/1/2022  Narrative:  History: PAD  Comments: Bilateral lower extremity arterial with multi-level pulse volume recordings and segmental pressures were performed at rest and stress.  The right ankle/brachial index is 1.1. The waveforms are triphasic without dampening.These findings are consistent with no significant arterial insufficiency of the right lower extremity at rest.  The left ankle/brachial index is 1.13. The waveforms are triphasic without dampening. These findings are consistent with no significant arterial insufficiency of the left lower extremity at rest.      Impression: Impression: 1. No significant arterial insufficiency of the right lower extremity at rest. 2. No significant arterial insufficiency of the left lower extremity at rest.   This report was finalized on 06/01/2022 14:13 by Dr. Nikhil Ott MD.     Patient Active Problem List   Diagnosis   • History of colon polyps   • Platelet inhibition due to Plavix   • Apical lung scarring   • Chronic back pain   • Centrilobular emphysema (HCC)   • Personal history of nicotine dependence   • COPD, group C, by GOLD 2017 classification (HCC)   • Abdominal pain, epigastric   • Hypertension   • Iron deficiency anemia secondary to inadequate dietary iron intake        Diagnosis Plan   1. PAD (peripheral artery disease) (HCC)  US Ankle / Brachial Indices Extremity Complete   2. Bilateral carotid artery stenosis  US Carotid Bilateral   3. Essential hypertension     4. Personal history of nicotine dependence         Plan: After thoroughly evaluating Virgil Knight, I believe the best course of action is to remain conservative from vascular  surgery standpoint.  Overall he is doing well and really denies any claudication to his lower extremities.  I did review his testing which shows no arterial insufficiency to his lower extremities.  His carotid duplex shows less than 50% carotid stenosis bilaterally.  I will see him back in 1 year with repeat noninvasive testing for continued surveillance, including ABIs and a carotid duplex.  His previous ultrasound of the aorta shows mild ectasia of the aorta measuring 2.3 cm.  I did discuss vascular risk factors as it pertain to the progression of vascular disease including controlling his hypertension and smoking cessation.  His blood pressure is stable on his current medication.  Unfortunately he does continue to smoke but has no desire to quit smoking at this time.  The patient is to continue taking their medications as previously discussed.   This was all discussed in full with complete understanding.  Thank you for allowing me to participate in the care of your patient.  Please do not hesitate to call with any questions or concerns.  We will keep you aware of any further encounters with Virgil Knight.        Sincerely yours,         ELMER Buckley Allie, APRN

## 2022-06-08 ENCOUNTER — OFFICE VISIT (OUTPATIENT)
Dept: VASCULAR SURGERY | Facility: CLINIC | Age: 67
End: 2022-06-08

## 2022-06-08 VITALS
HEIGHT: 71 IN | HEART RATE: 68 BPM | OXYGEN SATURATION: 95 % | SYSTOLIC BLOOD PRESSURE: 118 MMHG | DIASTOLIC BLOOD PRESSURE: 72 MMHG | BODY MASS INDEX: 19.6 KG/M2 | WEIGHT: 140 LBS

## 2022-06-08 DIAGNOSIS — Z87.891 PERSONAL HISTORY OF NICOTINE DEPENDENCE: Chronic | ICD-10-CM

## 2022-06-08 DIAGNOSIS — I73.9 PAD (PERIPHERAL ARTERY DISEASE): Primary | ICD-10-CM

## 2022-06-08 DIAGNOSIS — I10 ESSENTIAL HYPERTENSION: ICD-10-CM

## 2022-06-08 DIAGNOSIS — I65.23 BILATERAL CAROTID ARTERY STENOSIS: ICD-10-CM

## 2022-06-08 PROCEDURE — 99214 OFFICE O/P EST MOD 30 MIN: CPT | Performed by: NURSE PRACTITIONER

## 2022-07-25 DIAGNOSIS — J43.2 CENTRILOBULAR EMPHYSEMA: Primary | ICD-10-CM

## 2022-07-25 DIAGNOSIS — J44.9 COPD, GROUP C, BY GOLD 2017 CLASSIFICATION: Primary | ICD-10-CM

## 2022-07-25 RX ORDER — IPRATROPIUM BROMIDE AND ALBUTEROL SULFATE 2.5; .5 MG/3ML; MG/3ML
3 SOLUTION RESPIRATORY (INHALATION) 4 TIMES DAILY PRN
Qty: 360 ML | Refills: 3 | Status: SHIPPED | OUTPATIENT
Start: 2022-07-25

## 2022-07-25 NOTE — TELEPHONE ENCOUNTER
"Patient's wife states patient had to be taken to the ER at [facility] last night. They gave him a Zpak, Prednisone and a DuoNeb nebulizer treatment and then sent home. Wife states the DuoNeb worked \"wonders for his breathing\". He has a nebulizer at home but would like to have a \"mask\" if possible?  He does use Breztri and Albuterol HFA.  Please advise.  "

## 2022-08-01 DIAGNOSIS — J44.9 COPD, GROUP C, BY GOLD 2017 CLASSIFICATION: Primary | ICD-10-CM

## 2022-08-01 RX ORDER — BUDESONIDE, GLYCOPYRROLATE, AND FORMOTEROL FUMARATE 160; 9; 4.8 UG/1; UG/1; UG/1
2 AEROSOL, METERED RESPIRATORY (INHALATION) 2 TIMES DAILY
Qty: 32.1 G | Refills: 3 | Status: SHIPPED | OUTPATIENT
Start: 2022-08-01

## 2022-08-01 RX ORDER — BUDESONIDE, GLYCOPYRROLATE, AND FORMOTEROL FUMARATE 160; 9; 4.8 UG/1; UG/1; UG/1
2 AEROSOL, METERED RESPIRATORY (INHALATION) 2 TIMES DAILY
Qty: 32.1 G | Refills: 3 | Status: SHIPPED | OUTPATIENT
Start: 2022-08-01 | End: 2022-08-01 | Stop reason: SDUPTHER

## 2022-08-03 ENCOUNTER — OFFICE VISIT (OUTPATIENT)
Dept: GASTROENTEROLOGY | Facility: CLINIC | Age: 67
End: 2022-08-03

## 2022-08-03 VITALS
SYSTOLIC BLOOD PRESSURE: 132 MMHG | HEART RATE: 75 BPM | DIASTOLIC BLOOD PRESSURE: 72 MMHG | TEMPERATURE: 96.6 F | OXYGEN SATURATION: 96 % | BODY MASS INDEX: 18.76 KG/M2 | WEIGHT: 134 LBS | HEIGHT: 71 IN

## 2022-08-03 DIAGNOSIS — R13.19 ESOPHAGEAL DYSPHAGIA: Primary | ICD-10-CM

## 2022-08-03 DIAGNOSIS — Z71.6 TOBACCO ABUSE COUNSELING: ICD-10-CM

## 2022-08-03 DIAGNOSIS — I10 HTN (HYPERTENSION), BENIGN: ICD-10-CM

## 2022-08-03 DIAGNOSIS — R10.13 EPIGASTRIC PAIN: ICD-10-CM

## 2022-08-03 PROCEDURE — 99214 OFFICE O/P EST MOD 30 MIN: CPT | Performed by: CLINICAL NURSE SPECIALIST

## 2022-08-03 NOTE — H&P (VIEW-ONLY)
Virgil Knight  1955    8/3/2022  Chief Complaint   Patient presents with   • GI Problem     Abdominal pain and bloating     Subjective   HPI  Virgil Knight is a 67 y.o. male who presents with a complaint of epigastric abdominal pain he says that when he eats he has shortness of breath and severe fullness. He has belching and it may get better. He rates the pain 5 out of 10. No radiation. When he eats he has some dysphagia with solids. Intermittent for him. With bulk or large amounts of food. Located in the lower esophageal region. It is a cramp. Symptoms are moderate to severe at times. He does have reflux and indigestion as well moderate for him. Describes as a burning. He is on Protonix for this.    Workup has included a chest xray  The heart size and mediastinal contours are normal. No masses, infiltrates or effusions are seen. No pneumothorax is evident. Bilateral shoulder arthroplasties are seen. The lungs are hyperinflated.  Exam End: 07/25/22 00:11       Past Medical History:   Diagnosis Date   • Colon polyps    • COPD, group C, by GOLD 2017 classification ([facility]) 11/14/2019   • Hiatal hernia    • Hypertension    • Personal history of nicotine dependence 11/14/2019   • Seizure disorder (HCC)    • Stroke (HCC)      Past Surgical History:   Procedure Laterality Date   • BACK SURGERY     • COLONOSCOPY  04/22/2011    ADENOMATOUS/HYPERPLASTIC POLYP   • COLONOSCOPY N/A 12/12/2016    Hyperplastic polyp ascending colon, Diverticulosis repeat exam in 5 years   • COLONOSCOPY N/A 12/21/2021    Sessile serrated adnoma 15 cm, Tubular adenoma hepatic flexure, 2 hyperplastic polyps at 30 & 20 cm repeat exam in 3 years   • ENDOSCOPY  09/05/2014       • ENDOSCOPY N/A 06/05/2020    Normal exam   • HIP FRACTURE SURGERY     • SHOULDER SURGERY Bilateral    • WRIST SURGERY Right 2013       Outpatient Medications Marked as Taking for the 8/3/22 encounter (Office Visit) with Annie Klein APRN   Medication  Sig Dispense Refill   • amLODIPine (NORVASC) 10 MG tablet Take 10 mg by mouth.     • Aspirin Buf,LyCnp-SmFpr-TjArt, (ASCRIPTIN) 325 MG tablet Take 325 mg by mouth.     • Budeson-Glycopyrrol-Formoterol (Breztri Aerosphere) 160-9-4.8 MCG/ACT aerosol inhaler Inhale 2 puffs 2 (Two) Times a Day. 32.1 g 3   • citalopram (CeleXA) 20 MG tablet Take 20 mg by mouth daily.     • divalproex (DEPAKOTE) 500 MG DR tablet Take 500 mg by mouth daily.     • ipratropium-albuterol (DUO-NEB) 0.5-2.5 mg/3 ml nebulizer Take 3 mL by nebulization 4 (Four) Times a Day As Needed for Wheezing. 360 mL 3   • lamoTRIgine (LaMICtal) 200 MG tablet Take 200 mg by mouth Daily.     • lisinopril (PRINIVIL,ZESTRIL) 40 MG tablet Take 40 mg by mouth Daily.     • metoprolol tartrate (LOPRESSOR) 25 MG tablet Take 25 mg by mouth Daily. 1/2     • pantoprazole (PROTONIX) 40 MG EC tablet Take 40 mg by mouth daily.     • pregabalin (LYRICA) 50 MG capsule Take 50 mg by mouth Daily.       Allergies   Allergen Reactions   • Amoxicillin-Pot Clavulanate Rash   • Codeine Nausea And Vomiting     Social History     Socioeconomic History   • Marital status:    Tobacco Use   • Smoking status: Current Every Day Smoker     Packs/day: 0.50     Years: 30.00     Pack years: 15.00     Types: Cigarettes     Start date: 1976   • Smokeless tobacco: Never Used   Vaping Use   • Vaping Use: Never used   Substance and Sexual Activity   • Alcohol use: No   • Drug use: No   • Sexual activity: Defer     Family History   Problem Relation Age of Onset   • Colon cancer Other    • Colon polyps Other      Health Maintenance   Topic Date Due   • Pneumococcal Vaccine 65+ (1 - PCV) Never done   • TDAP/TD VACCINES (1 - Tdap) Never done   • ZOSTER VACCINE (1 of 2) Never done   • HEPATITIS C SCREENING  Never done   • ANNUAL WELLNESS VISIT  Never done   • COVID-19 Vaccine (2 - Booster for Malini series) 05/06/2021   • INFLUENZA VACCINE  10/01/2022   • COLORECTAL CANCER SCREENING  12/21/2024  "    Review of Systems   Constitutional: Negative for activity change, appetite change, chills, diaphoresis, fatigue, fever and unexpected weight change.   HENT: Positive for trouble swallowing. Negative for ear pain, hearing loss, mouth sores, sore throat and voice change.    Eyes: Negative.    Respiratory: Negative for cough, choking, shortness of breath and wheezing.    Cardiovascular: Negative for chest pain and palpitations.   Gastrointestinal: Positive for abdominal pain. Negative for abdominal distention, blood in stool, constipation, diarrhea, nausea and vomiting.   Endocrine: Negative for cold intolerance and heat intolerance.   Genitourinary: Negative for decreased urine volume, dysuria, frequency, hematuria and urgency.   Musculoskeletal: Negative for back pain, gait problem and myalgias.   Skin: Negative for color change, pallor and rash.   Allergic/Immunologic: Negative for food allergies and immunocompromised state.   Neurological: Negative for dizziness, tremors, seizures, syncope, weakness, light-headedness, numbness and headaches.   Hematological: Negative for adenopathy. Does not bruise/bleed easily.   Psychiatric/Behavioral: Negative for agitation and confusion. The patient is not nervous/anxious.    All other systems reviewed and are negative.    Objective   Vitals:    08/03/22 1004   BP: 132/72   Pulse: 75   Temp: 96.6 °F (35.9 °C)   SpO2: 96%   Weight: 60.8 kg (134 lb)   Height: 180.3 cm (71\")     Body mass index is 18.69 kg/m².  Physical Exam  Constitutional:       Appearance: He is well-developed.   HENT:      Head: Normocephalic and atraumatic.   Eyes:      Pupils: Pupils are equal, round, and reactive to light.   Neck:      Trachea: No tracheal deviation.   Cardiovascular:      Rate and Rhythm: Normal rate and regular rhythm.      Heart sounds: Normal heart sounds. No murmur heard.    No friction rub. No gallop.   Pulmonary:      Effort: Pulmonary effort is normal. No respiratory distress. "      Breath sounds: Normal breath sounds. No wheezing or rales.   Chest:      Chest wall: No tenderness.   Abdominal:      General: Bowel sounds are normal. There is no distension.      Palpations: Abdomen is soft. Abdomen is not rigid.      Tenderness: There is abdominal tenderness. There is no guarding or rebound.   Musculoskeletal:         General: No tenderness or deformity. Normal range of motion.      Cervical back: Normal range of motion and neck supple.   Skin:     General: Skin is warm and dry.      Coloration: Skin is not pale.      Findings: No rash.   Neurological:      Mental Status: He is alert and oriented to person, place, and time.      Deep Tendon Reflexes: Reflexes are normal and symmetric.   Psychiatric:         Behavior: Behavior normal.         Thought Content: Thought content normal.         Judgment: Judgment normal.       Assessment & Plan   Diagnoses and all orders for this visit:    1. Esophageal dysphagia (Primary)  -     Case Request; Standing  -     COVID PRE-OP / PRE-PROCEDURE SCREENING ORDER (NO ISOLATION) - Swab, Nasal Cavity; Future  -     Follow Anesthesia Guidelines / Standing Orders; Future  -     Obtain Informed Consent; Future  -     Case Request    2. Epigastric pain    3. HTN (hypertension), benign  Comments:  cont BP medication the day of procedure    4. Tobacco abuse counseling    I discussed non pharmaceutical treatment of gerd.  This includes gradually losing weight to achieve ideal body wt., elevation of the head of bed by 4-6 inches, nothing to eat or drink 3 hours prior to lying down, avoiding tight clothing, stress reduction, tobacco cessation, reduction of alcohol intake, and dietary restrictions (avoiding caffeine, coffee, fatty foods, mints, chocolate, spicy foods and tomato based sauces as much as possible).      ESOPHAGOGASTRODUODENOSCOPY WITH ANESTHESIA (N/A)  Part of this note may be an electronic transcription/translation of spoken language to printed text  using the Dragon Dictation System.  Body mass index is 18.69 kg/m².  Return in about 6 weeks (around 9/14/2022).    BMI is within normal parameters. No other follow-up for BMI required.      All risks, benefits, alternatives, and indications of colonoscopy and/or Endoscopy procedure have been discussed with the patient. Risks to include perforation of the colon requiring possible surgery or colostomy, risk of bleeding from biopsies or removal of colon tissue, possibility of missing a colon polyp or cancer, or adverse drug reaction.  Benefits to include the diagnosis and management of disease of the colon and rectum. Alternatives to include barium enema, radiographic evaluation, lab testing or no intervention. Pt verbalizes understanding and agrees.     Annie Klein, APRN  8/3/2022  10:46 CDT          If you smoke or use tobacco, 4 minutes reading provided  Steps to Quit Smoking  Smoking tobacco can be harmful to your health and can affect almost every organ in your body. Smoking puts you, and those around you, at risk for developing many serious chronic diseases. Quitting smoking is difficult, but it is one of the best things that you can do for your health. It is never too late to quit.  What are the benefits of quitting smoking?  When you quit smoking, you lower your risk of developing serious diseases and conditions, such as:  · Lung cancer or lung disease, such as COPD.  · Heart disease.  · Stroke.  · Heart attack.  · Infertility.  · Osteoporosis and bone fractures.  Additionally, symptoms such as coughing, wheezing, and shortness of breath may get better when you quit. You may also find that you get sick less often because your body is stronger at fighting off colds and infections. If you are pregnant, quitting smoking can help to reduce your chances of having a baby of low birth weight.  How do I get ready to quit?  When you decide to quit smoking, create a plan to make sure that you are successful.  Before you quit:  · Pick a date to quit. Set a date within the next two weeks to give you time to prepare.  · Write down the reasons why you are quitting. Keep this list in places where you will see it often, such as on your bathroom mirror or in your car or wallet.  · Identify the people, places, things, and activities that make you want to smoke (triggers) and avoid them. Make sure to take these actions:  ¨ Throw away all cigarettes at home, at work, and in your car.  ¨ Throw away smoking accessories, such as ashtrays and lighters.  ¨ Clean your car and make sure to empty the ashtray.  ¨ Clean your home, including curtains and carpets.  · Tell your family, friends, and coworkers that you are quitting. Support from your loved ones can make quitting easier.  · Talk with your health care provider about your options for quitting smoking.  · Find out what treatment options are covered by your health insurance.  What strategies can I use to quit smoking?  Talk with your healthcare provider about different strategies to quit smoking. Some strategies include:  · Quitting smoking altogether instead of gradually lessening how much you smoke over a period of time. Research shows that quitting “cold turkey” is more successful than gradually quitting.  · Attending in-person counseling to help you build problem-solving skills. You are more likely to have success in quitting if you attend several counseling sessions. Even short sessions of 10 minutes can be effective.  · Finding resources and support systems that can help you to quit smoking and remain smoke-free after you quit. These resources are most helpful when you use them often. They can include:  ¨ Online chats with a counselor.  ¨ Telephone quitlines.  ¨ Printed self-help materials.  ¨ Support groups or group counseling.  ¨ Text messaging programs.  ¨ Mobile phone applications.  · Taking medicines to help you quit smoking. (If you are pregnant or breastfeeding, talk  with your health care provider first.) Some medicines contain nicotine and some do not. Both types of medicines help with cravings, but the medicines that include nicotine help to relieve withdrawal symptoms. Your health care provider may recommend:  ¨ Nicotine patches, gum, or lozenges.  ¨ Nicotine inhalers or sprays.  ¨ Non-nicotine medicine that is taken by mouth.  Talk with your health care provider about combining strategies, such as taking medicines while you are also receiving in-person counseling. Using these two strategies together makes you more likely to succeed in quitting than if you used either strategy on its own.  If you are pregnant or breastfeeding, talk with your health care provider about finding counseling or other support strategies to quit smoking. Do not take medicine to help you quit smoking unless told to do so by your health care provider.  What things can I do to make it easier to quit?  Quitting smoking might feel overwhelming at first, but there is a lot that you can do to make it easier. Take these important actions:  · Reach out to your family and friends and ask that they support and encourage you during this time. Call telephone quitlines, reach out to support groups, or work with a counselor for support.  · Ask people who smoke to avoid smoking around you.  · Avoid places that trigger you to smoke, such as bars, parties, or smoke-break areas at work.  · Spend time around people who do not smoke.  · Lessen stress in your life, because stress can be a smoking trigger for some people. To lessen stress, try:  ¨ Exercising regularly.  ¨ Deep-breathing exercises.  ¨ Yoga.  ¨ Meditating.  ¨ Performing a body scan. This involves closing your eyes, scanning your body from head to toe, and noticing which parts of your body are particularly tense. Purposefully relax the muscles in those areas.  · Download or purchase mobile phone or tablet apps (applications) that can help you stick to your  quit plan by providing reminders, tips, and encouragement. There are many free apps, such as QuitGuide from the CDC (Centers for Disease Control and Prevention). You can find other support for quitting smoking (smoking cessation) through smokefree.gov and other websites.  How will I feel when I quit smoking?  Within the first 24 hours of quitting smoking, you may start to feel some withdrawal symptoms. These symptoms are usually most noticeable 2-3 days after quitting, but they usually do not last beyond 2-3 weeks. Changes or symptoms that you might experience include:  · Mood swings.  · Restlessness, anxiety, or irritation.  · Difficulty concentrating.  · Dizziness.  · Strong cravings for sugary foods in addition to nicotine.  · Mild weight gain.  · Constipation.  · Nausea.  · Coughing or a sore throat.  · Changes in how your medicines work in your body.  · A depressed mood.  · Difficulty sleeping (insomnia).  After the first 2-3 weeks of quitting, you may start to notice more positive results, such as:  · Improved sense of smell and taste.  · Decreased coughing and sore throat.  · Slower heart rate.  · Lower blood pressure.  · Clearer skin.  · The ability to breathe more easily.  · Fewer sick days.  Quitting smoking is very challenging for most people. Do not get discouraged if you are not successful the first time. Some people need to make many attempts to quit before they achieve long-term success. Do your best to stick to your quit plan, and talk with your health care provider if you have any questions or concerns.  This information is not intended to replace advice given to you by your health care provider. Make sure you discuss any questions you have with your health care provider.  Document Released: 12/12/2002 Document Revised: 08/15/2017 Document Reviewed: 05/03/2016  [email or web address] Interactive Patient Education © 2017 [employer]

## 2022-08-03 NOTE — PROGRESS NOTES
Virgil Knight  1955    8/3/2022  Chief Complaint   Patient presents with   • GI Problem     Abdominal pain and bloating     Subjective   HPI  Virgil Knight is a 67 y.o. male who presents with a complaint of epigastric abdominal pain he says that when he eats he has shortness of breath and severe fullness. He has belching and it may get better. He rates the pain 5 out of 10. No radiation. When he eats he has some dysphagia with solids. Intermittent for him. With bulk or large amounts of food. Located in the lower esophageal region. It is a cramp. Symptoms are moderate to severe at times. He does have reflux and indigestion as well moderate for him. Describes as a burning. He is on Protonix for this.    Workup has included a chest xray  The heart size and mediastinal contours are normal. No masses, infiltrates or effusions are seen. No pneumothorax is evident. Bilateral shoulder arthroplasties are seen. The lungs are hyperinflated.  Exam End: 07/25/22 00:11       Past Medical History:   Diagnosis Date   • Colon polyps    • COPD, group C, by GOLD 2017 classification ([facility]) 11/14/2019   • Hiatal hernia    • Hypertension    • Personal history of nicotine dependence 11/14/2019   • Seizure disorder (HCC)    • Stroke (HCC)      Past Surgical History:   Procedure Laterality Date   • BACK SURGERY     • COLONOSCOPY  04/22/2011    ADENOMATOUS/HYPERPLASTIC POLYP   • COLONOSCOPY N/A 12/12/2016    Hyperplastic polyp ascending colon, Diverticulosis repeat exam in 5 years   • COLONOSCOPY N/A 12/21/2021    Sessile serrated adnoma 15 cm, Tubular adenoma hepatic flexure, 2 hyperplastic polyps at 30 & 20 cm repeat exam in 3 years   • ENDOSCOPY  09/05/2014       • ENDOSCOPY N/A 06/05/2020    Normal exam   • HIP FRACTURE SURGERY     • SHOULDER SURGERY Bilateral    • WRIST SURGERY Right 2013       Outpatient Medications Marked as Taking for the 8/3/22 encounter (Office Visit) with Annie Klein APRN   Medication  Sig Dispense Refill   • amLODIPine (NORVASC) 10 MG tablet Take 10 mg by mouth.     • Aspirin Buf,LnFhw-QrJzd-SlBvn, (ASCRIPTIN) 325 MG tablet Take 325 mg by mouth.     • Budeson-Glycopyrrol-Formoterol (Breztri Aerosphere) 160-9-4.8 MCG/ACT aerosol inhaler Inhale 2 puffs 2 (Two) Times a Day. 32.1 g 3   • citalopram (CeleXA) 20 MG tablet Take 20 mg by mouth daily.     • divalproex (DEPAKOTE) 500 MG DR tablet Take 500 mg by mouth daily.     • ipratropium-albuterol (DUO-NEB) 0.5-2.5 mg/3 ml nebulizer Take 3 mL by nebulization 4 (Four) Times a Day As Needed for Wheezing. 360 mL 3   • lamoTRIgine (LaMICtal) 200 MG tablet Take 200 mg by mouth Daily.     • lisinopril (PRINIVIL,ZESTRIL) 40 MG tablet Take 40 mg by mouth Daily.     • metoprolol tartrate (LOPRESSOR) 25 MG tablet Take 25 mg by mouth Daily. 1/2     • pantoprazole (PROTONIX) 40 MG EC tablet Take 40 mg by mouth daily.     • pregabalin (LYRICA) 50 MG capsule Take 50 mg by mouth Daily.       Allergies   Allergen Reactions   • Amoxicillin-Pot Clavulanate Rash   • Codeine Nausea And Vomiting     Social History     Socioeconomic History   • Marital status:    Tobacco Use   • Smoking status: Current Every Day Smoker     Packs/day: 0.50     Years: 30.00     Pack years: 15.00     Types: Cigarettes     Start date: 1976   • Smokeless tobacco: Never Used   Vaping Use   • Vaping Use: Never used   Substance and Sexual Activity   • Alcohol use: No   • Drug use: No   • Sexual activity: Defer     Family History   Problem Relation Age of Onset   • Colon cancer Other    • Colon polyps Other      Health Maintenance   Topic Date Due   • Pneumococcal Vaccine 65+ (1 - PCV) Never done   • TDAP/TD VACCINES (1 - Tdap) Never done   • ZOSTER VACCINE (1 of 2) Never done   • HEPATITIS C SCREENING  Never done   • ANNUAL WELLNESS VISIT  Never done   • COVID-19 Vaccine (2 - Booster for Malini series) 05/06/2021   • INFLUENZA VACCINE  10/01/2022   • COLORECTAL CANCER SCREENING  12/21/2024  "    Review of Systems   Constitutional: Negative for activity change, appetite change, chills, diaphoresis, fatigue, fever and unexpected weight change.   HENT: Positive for trouble swallowing. Negative for ear pain, hearing loss, mouth sores, sore throat and voice change.    Eyes: Negative.    Respiratory: Negative for cough, choking, shortness of breath and wheezing.    Cardiovascular: Negative for chest pain and palpitations.   Gastrointestinal: Positive for abdominal pain. Negative for abdominal distention, blood in stool, constipation, diarrhea, nausea and vomiting.   Endocrine: Negative for cold intolerance and heat intolerance.   Genitourinary: Negative for decreased urine volume, dysuria, frequency, hematuria and urgency.   Musculoskeletal: Negative for back pain, gait problem and myalgias.   Skin: Negative for color change, pallor and rash.   Allergic/Immunologic: Negative for food allergies and immunocompromised state.   Neurological: Negative for dizziness, tremors, seizures, syncope, weakness, light-headedness, numbness and headaches.   Hematological: Negative for adenopathy. Does not bruise/bleed easily.   Psychiatric/Behavioral: Negative for agitation and confusion. The patient is not nervous/anxious.    All other systems reviewed and are negative.    Objective   Vitals:    08/03/22 1004   BP: 132/72   Pulse: 75   Temp: 96.6 °F (35.9 °C)   SpO2: 96%   Weight: 60.8 kg (134 lb)   Height: 180.3 cm (71\")     Body mass index is 18.69 kg/m².  Physical Exam  Constitutional:       Appearance: He is well-developed.   HENT:      Head: Normocephalic and atraumatic.   Eyes:      Pupils: Pupils are equal, round, and reactive to light.   Neck:      Trachea: No tracheal deviation.   Cardiovascular:      Rate and Rhythm: Normal rate and regular rhythm.      Heart sounds: Normal heart sounds. No murmur heard.    No friction rub. No gallop.   Pulmonary:      Effort: Pulmonary effort is normal. No respiratory distress. "      Breath sounds: Normal breath sounds. No wheezing or rales.   Chest:      Chest wall: No tenderness.   Abdominal:      General: Bowel sounds are normal. There is no distension.      Palpations: Abdomen is soft. Abdomen is not rigid.      Tenderness: There is abdominal tenderness. There is no guarding or rebound.   Musculoskeletal:         General: No tenderness or deformity. Normal range of motion.      Cervical back: Normal range of motion and neck supple.   Skin:     General: Skin is warm and dry.      Coloration: Skin is not pale.      Findings: No rash.   Neurological:      Mental Status: He is alert and oriented to person, place, and time.      Deep Tendon Reflexes: Reflexes are normal and symmetric.   Psychiatric:         Behavior: Behavior normal.         Thought Content: Thought content normal.         Judgment: Judgment normal.       Assessment & Plan   Diagnoses and all orders for this visit:    1. Esophageal dysphagia (Primary)  -     Case Request; Standing  -     COVID PRE-OP / PRE-PROCEDURE SCREENING ORDER (NO ISOLATION) - Swab, Nasal Cavity; Future  -     Follow Anesthesia Guidelines / Standing Orders; Future  -     Obtain Informed Consent; Future  -     Case Request    2. Epigastric pain    3. HTN (hypertension), benign  Comments:  cont BP medication the day of procedure    4. Tobacco abuse counseling    I discussed non pharmaceutical treatment of gerd.  This includes gradually losing weight to achieve ideal body wt., elevation of the head of bed by 4-6 inches, nothing to eat or drink 3 hours prior to lying down, avoiding tight clothing, stress reduction, tobacco cessation, reduction of alcohol intake, and dietary restrictions (avoiding caffeine, coffee, fatty foods, mints, chocolate, spicy foods and tomato based sauces as much as possible).      ESOPHAGOGASTRODUODENOSCOPY WITH ANESTHESIA (N/A)  Part of this note may be an electronic transcription/translation of spoken language to printed text  using the Dragon Dictation System.  Body mass index is 18.69 kg/m².  Return in about 6 weeks (around 9/14/2022).    BMI is within normal parameters. No other follow-up for BMI required.      All risks, benefits, alternatives, and indications of colonoscopy and/or Endoscopy procedure have been discussed with the patient. Risks to include perforation of the colon requiring possible surgery or colostomy, risk of bleeding from biopsies or removal of colon tissue, possibility of missing a colon polyp or cancer, or adverse drug reaction.  Benefits to include the diagnosis and management of disease of the colon and rectum. Alternatives to include barium enema, radiographic evaluation, lab testing or no intervention. Pt verbalizes understanding and agrees.     Annie Klein, APRN  8/3/2022  10:46 CDT          If you smoke or use tobacco, 4 minutes reading provided  Steps to Quit Smoking  Smoking tobacco can be harmful to your health and can affect almost every organ in your body. Smoking puts you, and those around you, at risk for developing many serious chronic diseases. Quitting smoking is difficult, but it is one of the best things that you can do for your health. It is never too late to quit.  What are the benefits of quitting smoking?  When you quit smoking, you lower your risk of developing serious diseases and conditions, such as:  · Lung cancer or lung disease, such as COPD.  · Heart disease.  · Stroke.  · Heart attack.  · Infertility.  · Osteoporosis and bone fractures.  Additionally, symptoms such as coughing, wheezing, and shortness of breath may get better when you quit. You may also find that you get sick less often because your body is stronger at fighting off colds and infections. If you are pregnant, quitting smoking can help to reduce your chances of having a baby of low birth weight.  How do I get ready to quit?  When you decide to quit smoking, create a plan to make sure that you are successful.  Before you quit:  · Pick a date to quit. Set a date within the next two weeks to give you time to prepare.  · Write down the reasons why you are quitting. Keep this list in places where you will see it often, such as on your bathroom mirror or in your car or wallet.  · Identify the people, places, things, and activities that make you want to smoke (triggers) and avoid them. Make sure to take these actions:  ¨ Throw away all cigarettes at home, at work, and in your car.  ¨ Throw away smoking accessories, such as ashtrays and lighters.  ¨ Clean your car and make sure to empty the ashtray.  ¨ Clean your home, including curtains and carpets.  · Tell your family, friends, and coworkers that you are quitting. Support from your loved ones can make quitting easier.  · Talk with your health care provider about your options for quitting smoking.  · Find out what treatment options are covered by your health insurance.  What strategies can I use to quit smoking?  Talk with your healthcare provider about different strategies to quit smoking. Some strategies include:  · Quitting smoking altogether instead of gradually lessening how much you smoke over a period of time. Research shows that quitting “cold turkey” is more successful than gradually quitting.  · Attending in-person counseling to help you build problem-solving skills. You are more likely to have success in quitting if you attend several counseling sessions. Even short sessions of 10 minutes can be effective.  · Finding resources and support systems that can help you to quit smoking and remain smoke-free after you quit. These resources are most helpful when you use them often. They can include:  ¨ Online chats with a counselor.  ¨ Telephone quitlines.  ¨ Printed self-help materials.  ¨ Support groups or group counseling.  ¨ Text messaging programs.  ¨ Mobile phone applications.  · Taking medicines to help you quit smoking. (If you are pregnant or breastfeeding, talk  with your health care provider first.) Some medicines contain nicotine and some do not. Both types of medicines help with cravings, but the medicines that include nicotine help to relieve withdrawal symptoms. Your health care provider may recommend:  ¨ Nicotine patches, gum, or lozenges.  ¨ Nicotine inhalers or sprays.  ¨ Non-nicotine medicine that is taken by mouth.  Talk with your health care provider about combining strategies, such as taking medicines while you are also receiving in-person counseling. Using these two strategies together makes you more likely to succeed in quitting than if you used either strategy on its own.  If you are pregnant or breastfeeding, talk with your health care provider about finding counseling or other support strategies to quit smoking. Do not take medicine to help you quit smoking unless told to do so by your health care provider.  What things can I do to make it easier to quit?  Quitting smoking might feel overwhelming at first, but there is a lot that you can do to make it easier. Take these important actions:  · Reach out to your family and friends and ask that they support and encourage you during this time. Call telephone quitlines, reach out to support groups, or work with a counselor for support.  · Ask people who smoke to avoid smoking around you.  · Avoid places that trigger you to smoke, such as bars, parties, or smoke-break areas at work.  · Spend time around people who do not smoke.  · Lessen stress in your life, because stress can be a smoking trigger for some people. To lessen stress, try:  ¨ Exercising regularly.  ¨ Deep-breathing exercises.  ¨ Yoga.  ¨ Meditating.  ¨ Performing a body scan. This involves closing your eyes, scanning your body from head to toe, and noticing which parts of your body are particularly tense. Purposefully relax the muscles in those areas.  · Download or purchase mobile phone or tablet apps (applications) that can help you stick to your  quit plan by providing reminders, tips, and encouragement. There are many free apps, such as QuitGuide from the CDC (Centers for Disease Control and Prevention). You can find other support for quitting smoking (smoking cessation) through smokefree.gov and other websites.  How will I feel when I quit smoking?  Within the first 24 hours of quitting smoking, you may start to feel some withdrawal symptoms. These symptoms are usually most noticeable 2-3 days after quitting, but they usually do not last beyond 2-3 weeks. Changes or symptoms that you might experience include:  · Mood swings.  · Restlessness, anxiety, or irritation.  · Difficulty concentrating.  · Dizziness.  · Strong cravings for sugary foods in addition to nicotine.  · Mild weight gain.  · Constipation.  · Nausea.  · Coughing or a sore throat.  · Changes in how your medicines work in your body.  · A depressed mood.  · Difficulty sleeping (insomnia).  After the first 2-3 weeks of quitting, you may start to notice more positive results, such as:  · Improved sense of smell and taste.  · Decreased coughing and sore throat.  · Slower heart rate.  · Lower blood pressure.  · Clearer skin.  · The ability to breathe more easily.  · Fewer sick days.  Quitting smoking is very challenging for most people. Do not get discouraged if you are not successful the first time. Some people need to make many attempts to quit before they achieve long-term success. Do your best to stick to your quit plan, and talk with your health care provider if you have any questions or concerns.  This information is not intended to replace advice given to you by your health care provider. Make sure you discuss any questions you have with your health care provider.  Document Released: 12/12/2002 Document Revised: 08/15/2017 Document Reviewed: 05/03/2016  [employer] Patient Education © 2017 [employer]

## 2022-08-16 ENCOUNTER — LAB (OUTPATIENT)
Dept: LAB | Facility: HOSPITAL | Age: 67
End: 2022-08-16

## 2022-08-16 DIAGNOSIS — R13.19 ESOPHAGEAL DYSPHAGIA: ICD-10-CM

## 2022-08-16 LAB — SARS-COV-2 ORF1AB RESP QL NAA+PROBE: NOT DETECTED

## 2022-08-16 PROCEDURE — U0004 COV-19 TEST NON-CDC HGH THRU: HCPCS

## 2022-08-16 PROCEDURE — C9803 HOPD COVID-19 SPEC COLLECT: HCPCS

## 2022-08-19 ENCOUNTER — HOSPITAL ENCOUNTER (OUTPATIENT)
Facility: HOSPITAL | Age: 67
Setting detail: HOSPITAL OUTPATIENT SURGERY
Discharge: HOME | End: 2022-08-19
Attending: INTERNAL MEDICINE | Admitting: INTERNAL MEDICINE

## 2022-08-19 ENCOUNTER — ANESTHESIA EVENT (OUTPATIENT)
Dept: GASTROENTEROLOGY | Facility: HOSPITAL | Age: 67
End: 2022-08-19

## 2022-08-19 ENCOUNTER — ANESTHESIA (OUTPATIENT)
Dept: GASTROENTEROLOGY | Facility: HOSPITAL | Age: 67
End: 2022-08-19

## 2022-08-19 VITALS
WEIGHT: 137 LBS | TEMPERATURE: 96.9 F | DIASTOLIC BLOOD PRESSURE: 68 MMHG | SYSTOLIC BLOOD PRESSURE: 145 MMHG | HEART RATE: 63 BPM | HEIGHT: 71 IN | RESPIRATION RATE: 17 BRPM | BODY MASS INDEX: 19.18 KG/M2 | OXYGEN SATURATION: 97 %

## 2022-08-19 DIAGNOSIS — R10.13 ABDOMINAL PAIN, EPIGASTRIC: Primary | ICD-10-CM

## 2022-08-19 DIAGNOSIS — R13.19 ESOPHAGEAL DYSPHAGIA: ICD-10-CM

## 2022-08-19 PROCEDURE — 87081 CULTURE SCREEN ONLY: CPT | Performed by: INTERNAL MEDICINE

## 2022-08-19 PROCEDURE — 43239 EGD BIOPSY SINGLE/MULTIPLE: CPT | Performed by: INTERNAL MEDICINE

## 2022-08-19 PROCEDURE — 25010000002 PROPOFOL 10 MG/ML EMULSION: Performed by: NURSE ANESTHETIST, CERTIFIED REGISTERED

## 2022-08-19 PROCEDURE — 88305 TISSUE EXAM BY PATHOLOGIST: CPT | Performed by: INTERNAL MEDICINE

## 2022-08-19 RX ORDER — PROPOFOL 10 MG/ML
VIAL (ML) INTRAVENOUS AS NEEDED
Status: DISCONTINUED | OUTPATIENT
Start: 2022-08-19 | End: 2022-08-19 | Stop reason: SURG

## 2022-08-19 RX ORDER — LIDOCAINE HYDROCHLORIDE 20 MG/ML
INJECTION, SOLUTION EPIDURAL; INFILTRATION; INTRACAUDAL; PERINEURAL AS NEEDED
Status: DISCONTINUED | OUTPATIENT
Start: 2022-08-19 | End: 2022-08-19 | Stop reason: SURG

## 2022-08-19 RX ORDER — SODIUM CHLORIDE 0.9 % (FLUSH) 0.9 %
10 SYRINGE (ML) INJECTION AS NEEDED
Status: DISCONTINUED | OUTPATIENT
Start: 2022-08-19 | End: 2022-08-19 | Stop reason: HOSPADM

## 2022-08-19 RX ORDER — SODIUM CHLORIDE 9 MG/ML
500 INJECTION, SOLUTION INTRAVENOUS CONTINUOUS PRN
Status: DISCONTINUED | OUTPATIENT
Start: 2022-08-19 | End: 2022-08-19 | Stop reason: HOSPADM

## 2022-08-19 RX ORDER — LIDOCAINE HYDROCHLORIDE 10 MG/ML
0.5 INJECTION, SOLUTION EPIDURAL; INFILTRATION; INTRACAUDAL; PERINEURAL ONCE AS NEEDED
Status: DISCONTINUED | OUTPATIENT
Start: 2022-08-19 | End: 2022-08-19 | Stop reason: HOSPADM

## 2022-08-19 RX ADMIN — LIDOCAINE HYDROCHLORIDE 100 MG: 20 INJECTION, SOLUTION EPIDURAL; INFILTRATION; INTRACAUDAL; PERINEURAL at 08:42

## 2022-08-19 RX ADMIN — PROPOFOL 150 MG: 10 INJECTION, EMULSION INTRAVENOUS at 08:42

## 2022-08-19 RX ADMIN — SODIUM CHLORIDE 500 ML: 9 INJECTION, SOLUTION INTRAVENOUS at 08:01

## 2022-08-19 NOTE — ANESTHESIA POSTPROCEDURE EVALUATION
"Patient: Virgil HECK    Procedure Summary     Date: 08/19/22 Room / Location: [facility] ENDOSCOPY 2 / [facility] ENDOSCOPY    Anesthesia Start: 0840 Anesthesia Stop: 0851    Procedure: ESOPHAGOGASTRODUODENOSCOPY WITH ANESTHESIA (N/A ) Diagnosis:       Esophageal dysphagia      (Esophageal dysphagia [R13.19])    Surgeons: Chandra Ordaz MD Provider: Trina Villarreal CRNA    Anesthesia Type: MAC ASA Status: 3          Anesthesia Type: MAC    Vitals  Vitals Value Taken Time   /65 08/19/22 0851   Temp     Pulse 70 08/19/22 0853   Resp     SpO2 99 % 08/19/22 0853   Vitals shown include unvalidated device data.        Post Anesthesia Care and Evaluation    Patient location during evaluation: PACU  Patient participation: complete - patient participated  Level of consciousness: awake and alert  Pain management: adequate    Airway patency: patent  Anesthetic complications: No anesthetic complications    Cardiovascular status: acceptable  Respiratory status: acceptable  Hydration status: acceptable    Comments: Blood pressure 145/68, pulse 63, temperature 96.9 °F (36.1 °C), temperature source Temporal, resp. rate 20, height 180.3 cm (71\"), weight 62.1 kg (137 lb), SpO2 97 %.    Pt discharged from PACU based on tere score >8      " No

## 2022-08-19 NOTE — ANESTHESIA PREPROCEDURE EVALUATION
Anesthesia Evaluation     Patient summary reviewed and Nursing notes reviewed   no history of anesthetic complications:  NPO Solid Status: > 8 hours  NPO Liquid Status: > 8 hours           Airway   Mallampati: II  TM distance: >3 FB  Neck ROM: full  No difficulty expected  Dental    (+) upper dentures and lower dentures    Pulmonary    (+) a smoker Current, COPD,   (-) asthma, sleep apnea  Cardiovascular   Exercise tolerance: poor (<4 METS)    (+) hypertension,   (-) CAD      Neuro/Psych  (+) seizures well controlled, CVA (right sided weakness) residual symptoms,    GI/Hepatic/Renal/Endo    (+)  hiatal hernia,    (-) liver disease, no renal disease, diabetes    Musculoskeletal     Abdominal    Substance History      OB/GYN          Other                          Anesthesia Plan    ASA 3     MAC     intravenous induction     Anesthetic plan, risks, benefits, and alternatives have been provided, discussed and informed consent has been obtained with: patient.

## 2022-08-20 LAB — UREASE TISS QL: NEGATIVE

## 2022-08-22 LAB
CYTO UR: NORMAL
LAB AP CASE REPORT: NORMAL
Lab: NORMAL
PATH REPORT.FINAL DX SPEC: NORMAL
PATH REPORT.GROSS SPEC: NORMAL

## 2022-09-02 ENCOUNTER — HOSPITAL ENCOUNTER (OUTPATIENT)
Dept: CT IMAGING | Facility: HOSPITAL | Age: 67
Discharge: HOME | End: 2022-09-02
Admitting: INTERNAL MEDICINE

## 2022-09-02 PROCEDURE — 0 IOPAMIDOL PER 1 ML: Performed by: INTERNAL MEDICINE

## 2022-09-02 PROCEDURE — 74177 CT ABD & PELVIS W/CONTRAST: CPT

## 2022-09-02 RX ADMIN — IOPAMIDOL 100 ML: 755 INJECTION, SOLUTION INTRAVENOUS at 12:47

## 2022-09-07 ENCOUNTER — OFFICE VISIT (OUTPATIENT)
Dept: GASTROENTEROLOGY | Facility: CLINIC | Age: 67
End: 2022-09-07

## 2022-09-07 ENCOUNTER — LAB (OUTPATIENT)
Dept: LAB | Facility: HOSPITAL | Age: 67
End: 2022-09-07

## 2022-09-07 VITALS
HEIGHT: 71 IN | HEART RATE: 60 BPM | WEIGHT: 141 LBS | DIASTOLIC BLOOD PRESSURE: 80 MMHG | BODY MASS INDEX: 19.74 KG/M2 | SYSTOLIC BLOOD PRESSURE: 140 MMHG | OXYGEN SATURATION: 98 % | TEMPERATURE: 97.7 F

## 2022-09-07 DIAGNOSIS — Z71.6 TOBACCO ABUSE COUNSELING: ICD-10-CM

## 2022-09-07 DIAGNOSIS — K86.2 PANCREATIC CYST: Primary | ICD-10-CM

## 2022-09-07 LAB — CANCER AG19-9 SERPL-ACNC: 15.1 U/ML

## 2022-09-07 PROCEDURE — 86301 IMMUNOASSAY TUMOR CA 19-9: CPT | Performed by: CLINICAL NURSE SPECIALIST

## 2022-09-07 PROCEDURE — 36415 COLL VENOUS BLD VENIPUNCTURE: CPT | Performed by: CLINICAL NURSE SPECIALIST

## 2022-09-07 PROCEDURE — 99214 OFFICE O/P EST MOD 30 MIN: CPT | Performed by: CLINICAL NURSE SPECIALIST

## 2022-09-07 NOTE — PROGRESS NOTES
Virgil HECK  1955 9/7/2022  Chief Complaint   Patient presents with   • GI Problem     Follow office visit on dysphagia      Subjective   HPI  Virgil HECK is a 67 y.o. male who presents with a complaint of iron def anemia with recent endo colon follow up CT enterography performed on 9/2/22 showing IMPRESSION:     1.  No acute abdominopelvic findings. No evidence of active bowel  inflammation or abnormal bowel distention.     2.  Redemonstrated 1.1 cm cystic lesion in the pancreas, better  evaluated on prior MR.     3.  Enlarged prostate. Mild diffuse urinary bladder wall thickening,  potentially related to chronic partial outlet obstruction.     4.  3.2 cm infrarenal abdominal aortic aneurysm.  This report was finalized on 09/02/2022 15:48 by Dr. Mariusz Frazier MD.   Incidental findings. No symptoms of nausea or vomiting. He is eating well. No wt loss. No abdominal pain. Swallowing is better. No further dysphagia. He has not smoked in 3 weeks. This has helped his stomach problems he says.     Past Medical History:   Diagnosis Date   • Colon polyps    • COPD, group C, by GOLD 2017 classification (HCC) 11/14/2019   • Hiatal hernia    • Hypertension    • Personal history of nicotine dependence 11/14/2019   • Seizure disorder (HCC)    • Stroke (HCC)      Past Surgical History:   Procedure Laterality Date   • BACK SURGERY     • COLONOSCOPY  04/22/2011    ADENOMATOUS/HYPERPLASTIC POLYP   • COLONOSCOPY N/A 12/12/2016    Hyperplastic polyp ascending colon, Diverticulosis repeat exam in 5 years   • COLONOSCOPY N/A 12/21/2021    Sessile serrated adnoma 15 cm, Tubular adenoma hepatic flexure, 2 hyperplastic polyps at 30 & 20 cm repeat exam in 3 years   • ENDOSCOPY  09/05/2014    [facility]   • ENDOSCOPY N/A 06/05/2020    Normal exam   • ENDOSCOPY N/A 08/19/2022    [facility] otherwise normal exam   • HIP FRACTURE SURGERY     • SHOULDER SURGERY Bilateral    • WRIST SURGERY Right 2013       Outpatient Medications Marked  as Taking for the 9/7/22 encounter (Office Visit) with Annie Klein APRN   Medication Sig Dispense Refill   • amLODIPine (NORVASC) 10 MG tablet Take 10 mg by mouth.     • Aspirin Buf,SjGoo-QcNkf-VrSqu, (ASCRIPTIN) 325 MG tablet Take 325 mg by mouth.     • Budeson-Glycopyrrol-Formoterol (Breztri Aerosphere) 160-9-4.8 MCG/ACT aerosol inhaler Inhale 2 puffs 2 (Two) Times a Day. 32.1 g 3   • citalopram (CeleXA) 20 MG tablet Take 20 mg by mouth daily.     • divalproex (DEPAKOTE) 500 MG DR tablet Take 500 mg by mouth daily.     • ipratropium-albuterol (DUO-NEB) 0.5-2.5 mg/3 ml nebulizer Take 3 mL by nebulization 4 (Four) Times a Day As Needed for Wheezing. 360 mL 3   • lamoTRIgine (LaMICtal) 200 MG tablet Take 200 mg by mouth Daily.     • lisinopril (PRINIVIL,ZESTRIL) 40 MG tablet Take 40 mg by mouth Daily.     • metoprolol tartrate (LOPRESSOR) 25 MG tablet Take 25 mg by mouth Daily. 1/2     • pantoprazole (PROTONIX) 40 MG EC tablet Take 40 mg by mouth daily.       Allergies   Allergen Reactions   • Amoxicillin-Pot Clavulanate Rash   • Codeine Nausea And Vomiting     Social History     Socioeconomic History   • Marital status:    Tobacco Use   • Smoking status: Current Every Day Smoker     Packs/day: 0.50     Years: 30.00     Pack years: 15.00     Types: Cigarettes     Start date: 1976   • Smokeless tobacco: Never Used   Vaping Use   • Vaping Use: Never used   Substance and Sexual Activity   • Alcohol use: No   • Drug use: No   • Sexual activity: Defer     Family History   Problem Relation Age of Onset   • Heart disease Mother    • Tuberculosis Father    • Colon cancer Other    • Colon polyps Other      Health Maintenance   Topic Date Due   • Pneumococcal Vaccine 65+ (1 - PCV) Never done   • TDAP/TD VACCINES (1 - Tdap) Never done   • ZOSTER VACCINE (1 of 2) Never done   • HEPATITIS C SCREENING  Never done   • ANNUAL WELLNESS VISIT  Never done   • COVID-19 Vaccine (2 - Booster for Malini series) 05/06/2021  "  • INFLUENZA VACCINE  10/01/2022   • COLORECTAL CANCER SCREENING  12/21/2024     Review of Systems   Constitutional: Negative for activity change, appetite change, chills, diaphoresis, fatigue, fever and unexpected weight change.   HENT: Negative for ear pain, hearing loss, mouth sores, sore throat, trouble swallowing and voice change.    Eyes: Negative.    Respiratory: Negative for cough, choking, shortness of breath and wheezing.    Cardiovascular: Negative for chest pain and palpitations.   Gastrointestinal: Negative for abdominal pain, blood in stool, constipation, diarrhea, nausea and vomiting.   Endocrine: Negative for cold intolerance and heat intolerance.   Genitourinary: Negative for decreased urine volume, dysuria, frequency, hematuria and urgency.   Musculoskeletal: Negative for back pain, gait problem and myalgias.   Skin: Negative for color change, pallor and rash.   Allergic/Immunologic: Negative for food allergies and immunocompromised state.   Neurological: Negative for dizziness, tremors, seizures, syncope, weakness, light-headedness, numbness and headaches.   Hematological: Negative for adenopathy. Does not bruise/bleed easily.   Psychiatric/Behavioral: Negative for agitation and confusion. The patient is not nervous/anxious.    All other systems reviewed and are negative.    Objective   Vitals:    09/07/22 1115   BP: 140/80   Pulse: 60   Temp: 97.7 °F (36.5 °C)   SpO2: 98%   Weight: 64 kg (141 lb)   Height: 180.3 cm (71\")     Body mass index is 19.67 kg/m².  Physical Exam  Constitutional:       Appearance: He is well-developed.   HENT:      Head: Normocephalic and atraumatic.   Eyes:      Pupils: Pupils are equal, round, and reactive to light.   Neck:      Trachea: No tracheal deviation.   Cardiovascular:      Rate and Rhythm: Normal rate and regular rhythm.      Heart sounds: Normal heart sounds. No murmur heard.    No friction rub. No gallop.   Pulmonary:      Effort: Pulmonary effort is " normal. No respiratory distress.      Breath sounds: Normal breath sounds. No wheezing or rales.   Chest:      Chest wall: No tenderness.   Abdominal:      General: Bowel sounds are normal. There is no distension.      Palpations: Abdomen is soft. Abdomen is not rigid.      Tenderness: There is no abdominal tenderness. There is no guarding or rebound.   Musculoskeletal:         General: No tenderness or deformity. Normal range of motion.      Cervical back: Normal range of motion and neck supple.   Skin:     General: Skin is warm and dry.      Coloration: Skin is not pale.      Findings: No rash.   Neurological:      Mental Status: He is alert and oriented to person, place, and time.      Deep Tendon Reflexes: Reflexes are normal and symmetric.   Psychiatric:         Behavior: Behavior normal.         Thought Content: Thought content normal.         Judgment: Judgment normal.       Assessment & Plan   Diagnoses and all orders for this visit:    1. Pancreatic cyst (Primary)  -     MRI abdomen w wo contrast mrcp; Future  -     Cancer Antigen 19-9    2. Tobacco abuse counseling    Fax CT to PCP  Keep follow up with PCP regarding other CT findings. Will get MRCP to further evaluate pancreatic cystic lesion  He is aware of all results I read the report to he and his wife; states aneurysm is not new.       Part of this note may be an electronic transcription/translation of spoken language to printed text using the Dragon Dictation System.  Body mass index is 19.67 kg/m².  No follow-ups on file.    BMI is within normal parameters. No other follow-up for BMI required.      All risks, benefits, alternatives, and indications of colonoscopy and/or Endoscopy procedure have been discussed with the patient. Risks to include perforation of the colon requiring possible surgery or colostomy, risk of bleeding from biopsies or removal of colon tissue, possibility of missing a colon polyp or cancer, or adverse drug reaction.  Benefits  to include the diagnosis and management of disease of the colon and rectum. Alternatives to include barium enema, radiographic evaluation, lab testing or no intervention. Pt verbalizes understanding and agrees.     Annie Johnson Ernie, APRN  9/7/2022  11:38 CDT          If you smoke or use tobacco, 4 minutes reading provided  Steps to Quit Smoking  Smoking tobacco can be harmful to your health and can affect almost every organ in your body. Smoking puts you, and those around you, at risk for developing many serious chronic diseases. Quitting smoking is difficult, but it is one of the best things that you can do for your health. It is never too late to quit.  What are the benefits of quitting smoking?  When you quit smoking, you lower your risk of developing serious diseases and conditions, such as:  · Lung cancer or lung disease, such as COPD.  · Heart disease.  · Stroke.  · Heart attack.  · Infertility.  · Osteoporosis and bone fractures.  Additionally, symptoms such as coughing, wheezing, and shortness of breath may get better when you quit. You may also find that you get sick less often because your body is stronger at fighting off colds and infections. If you are pregnant, quitting smoking can help to reduce your chances of having a baby of low birth weight.  How do I get ready to quit?  When you decide to quit smoking, create a plan to make sure that you are successful. Before you quit:  · Pick a date to quit. Set a date within the next two weeks to give you time to prepare.  · Write down the reasons why you are quitting. Keep this list in places where you will see it often, such as on your bathroom mirror or in your car or wallet.  · Identify the people, places, things, and activities that make you want to smoke (triggers) and avoid them. Make sure to take these actions:  ¨ Throw away all cigarettes at home, at work, and in your car.  ¨ Throw away smoking accessories, such as ashtrays and lighters.  ¨ Clean  your car and make sure to empty the ashtray.  ¨ Clean your home, including curtains and carpets.  · Tell your family, friends, and coworkers that you are quitting. Support from your loved ones can make quitting easier.  · Talk with your health care provider about your options for quitting smoking.  · Find out what treatment options are covered by your health insurance.  What strategies can I use to quit smoking?  Talk with your healthcare provider about different strategies to quit smoking. Some strategies include:  · Quitting smoking altogether instead of gradually lessening how much you smoke over a period of time. Research shows that quitting “cold turkey” is more successful than gradually quitting.  · Attending in-person counseling to help you build problem-solving skills. You are more likely to have success in quitting if you attend several counseling sessions. Even short sessions of 10 minutes can be effective.  · Finding resources and support systems that can help you to quit smoking and remain smoke-free after you quit. These resources are most helpful when you use them often. They can include:  ¨ Online chats with a counselor.  ¨ Telephone quitlines.  ¨ Printed self-help materials.  ¨ Support groups or group counseling.  ¨ Text messaging programs.  ¨ Mobile phone applications.  · Taking medicines to help you quit smoking. (If you are pregnant or breastfeeding, talk with your health care provider first.) Some medicines contain nicotine and some do not. Both types of medicines help with cravings, but the medicines that include nicotine help to relieve withdrawal symptoms. Your health care provider may recommend:  ¨ Nicotine patches, gum, or lozenges.  ¨ Nicotine inhalers or sprays.  ¨ Non-nicotine medicine that is taken by mouth.  Talk with your health care provider about combining strategies, such as taking medicines while you are also receiving in-person counseling. Using these two strategies together makes  you more likely to succeed in quitting than if you used either strategy on its own.  If you are pregnant or breastfeeding, talk with your health care provider about finding counseling or other support strategies to quit smoking. Do not take medicine to help you quit smoking unless told to do so by your health care provider.  What things can I do to make it easier to quit?  Quitting smoking might feel overwhelming at first, but there is a lot that you can do to make it easier. Take these important actions:  · Reach out to your family and friends and ask that they support and encourage you during this time. Call telephone quitlines, reach out to support groups, or work with a counselor for support.  · Ask people who smoke to avoid smoking around you.  · Avoid places that trigger you to smoke, such as bars, parties, or smoke-break areas at work.  · Spend time around people who do not smoke.  · Lessen stress in your life, because stress can be a smoking trigger for some people. To lessen stress, try:  ¨ Exercising regularly.  ¨ Deep-breathing exercises.  ¨ Yoga.  ¨ Meditating.  ¨ Performing a body scan. This involves closing your eyes, scanning your body from head to toe, and noticing which parts of your body are particularly tense. Purposefully relax the muscles in those areas.  · Download or purchase mobile phone or tablet apps (applications) that can help you stick to your quit plan by providing reminders, tips, and encouragement. There are many free apps, such as QuitGuide from the CDC (Centers for Disease Control and Prevention). You can find other support for quitting smoking (smoking cessation) through smokefree.gov and other websites.  How will I feel when I quit smoking?  Within the first 24 hours of quitting smoking, you may start to feel some withdrawal symptoms. These symptoms are usually most noticeable 2-3 days after quitting, but they usually do not last beyond 2-3 weeks. Changes or symptoms that you  might experience include:  · Mood swings.  · Restlessness, anxiety, or irritation.  · Difficulty concentrating.  · Dizziness.  · Strong cravings for sugary foods in addition to nicotine.  · Mild weight gain.  · Constipation.  · Nausea.  · Coughing or a sore throat.  · Changes in how your medicines work in your body.  · A depressed mood.  · Difficulty sleeping (insomnia).  After the first 2-3 weeks of quitting, you may start to notice more positive results, such as:  · Improved sense of smell and taste.  · Decreased coughing and sore throat.  · Slower heart rate.  · Lower blood pressure.  · Clearer skin.  · The ability to breathe more easily.  · Fewer sick days.  Quitting smoking is very challenging for most people. Do not get discouraged if you are not successful the first time. Some people need to make many attempts to quit before they achieve long-term success. Do your best to stick to your quit plan, and talk with your health care provider if you have any questions or concerns.  This information is not intended to replace advice given to you by your health care provider. Make sure you discuss any questions you have with your health care provider.  Document Released: 12/12/2002 Document Revised: 08/15/2017 Document Reviewed: 05/03/2016  [employer] Interactive Patient Education © 2017 [employer]

## 2022-09-14 ENCOUNTER — HOSPITAL ENCOUNTER (OUTPATIENT)
Dept: MRI IMAGING | Facility: HOSPITAL | Age: 67
Discharge: HOME | End: 2022-09-14
Admitting: CLINICAL NURSE SPECIALIST

## 2022-09-14 DIAGNOSIS — K86.2 PANCREATIC CYST: ICD-10-CM

## 2022-09-14 LAB — CREAT BLDA-MCNC: 0.7 MG/DL (ref 0.6–1.3)

## 2022-09-14 PROCEDURE — A9577 INJ MULTIHANCE: HCPCS | Performed by: CLINICAL NURSE SPECIALIST

## 2022-09-14 PROCEDURE — 0 GADOBENATE DIMEGLUMINE 529 MG/ML SOLUTION: Performed by: CLINICAL NURSE SPECIALIST

## 2022-09-14 PROCEDURE — 82565 ASSAY OF CREATININE: CPT

## 2022-09-14 PROCEDURE — 74183 MRI ABD W/O CNTR FLWD CNTR: CPT

## 2022-09-14 RX ADMIN — GADOBENATE DIMEGLUMINE 12 ML: 529 INJECTION, SOLUTION INTRAVENOUS at 08:13

## 2022-09-16 DIAGNOSIS — K86.2 PANCREATIC CYST: Primary | ICD-10-CM

## 2022-10-03 ENCOUNTER — APPOINTMENT (OUTPATIENT)
Dept: MRI IMAGING | Facility: HOSPITAL | Age: 67
End: 2022-10-03

## 2022-10-05 ENCOUNTER — TELEPHONE (OUTPATIENT)
Dept: GASTROENTEROLOGY | Facility: CLINIC | Age: 67
End: 2022-10-05

## 2022-11-02 ENCOUNTER — TELEPHONE (OUTPATIENT)
Dept: GASTROENTEROLOGY | Facility: CLINIC | Age: 67
End: 2022-11-02

## 2022-11-15 PROBLEM — J98.4 APICAL LUNG SCARRING: Chronic | Status: RESOLVED | Noted: 2018-11-20 | Resolved: 2022-11-15

## 2022-11-15 PROBLEM — Z87.891 PERSONAL HISTORY OF NICOTINE DEPENDENCE: Chronic | Status: RESOLVED | Noted: 2019-11-14 | Resolved: 2022-11-15

## 2022-11-15 PROBLEM — F17.210 CIGARETTE NICOTINE DEPENDENCE WITHOUT COMPLICATION: Status: ACTIVE | Noted: 2022-11-15

## 2022-11-15 PROBLEM — F17.210 CIGARETTE NICOTINE DEPENDENCE WITHOUT COMPLICATION: Chronic | Status: ACTIVE | Noted: 2022-11-15

## 2022-12-07 ENCOUNTER — OFFICE VISIT (OUTPATIENT)
Dept: PULMONOLOGY | Facility: CLINIC | Age: 67
End: 2022-12-07

## 2022-12-07 ENCOUNTER — PROCEDURE VISIT (OUTPATIENT)
Dept: PULMONOLOGY | Facility: CLINIC | Age: 67
End: 2022-12-07

## 2022-12-07 VITALS
DIASTOLIC BLOOD PRESSURE: 82 MMHG | SYSTOLIC BLOOD PRESSURE: 130 MMHG | HEIGHT: 69 IN | WEIGHT: 150 LBS | HEART RATE: 66 BPM | BODY MASS INDEX: 22.22 KG/M2 | OXYGEN SATURATION: 97 %

## 2022-12-07 DIAGNOSIS — Z23 NEED FOR STREPTOCOCCUS PNEUMONIAE AND INFLUENZA VACCINATION: ICD-10-CM

## 2022-12-07 DIAGNOSIS — F17.210 CIGARETTE NICOTINE DEPENDENCE WITHOUT COMPLICATION: Chronic | ICD-10-CM

## 2022-12-07 DIAGNOSIS — J43.2 CENTRILOBULAR EMPHYSEMA: Primary | Chronic | ICD-10-CM

## 2022-12-07 DIAGNOSIS — J44.9 COPD, GROUP C, BY GOLD 2017 CLASSIFICATION: Primary | ICD-10-CM

## 2022-12-07 DIAGNOSIS — J44.9 COPD, GROUP C, BY GOLD 2017 CLASSIFICATION: Chronic | ICD-10-CM

## 2022-12-07 DIAGNOSIS — Z23 NEED FOR IMMUNIZATION AGAINST INFLUENZA: ICD-10-CM

## 2022-12-07 PROCEDURE — G0009 ADMIN PNEUMOCOCCAL VACCINE: HCPCS | Performed by: NURSE PRACTITIONER

## 2022-12-07 PROCEDURE — 90677 PCV20 VACCINE IM: CPT | Performed by: NURSE PRACTITIONER

## 2022-12-07 PROCEDURE — 94729 DIFFUSING CAPACITY: CPT | Performed by: NURSE PRACTITIONER

## 2022-12-07 PROCEDURE — G0008 ADMIN INFLUENZA VIRUS VAC: HCPCS | Performed by: NURSE PRACTITIONER

## 2022-12-07 PROCEDURE — 90662 IIV NO PRSV INCREASED AG IM: CPT | Performed by: NURSE PRACTITIONER

## 2022-12-07 PROCEDURE — 99214 OFFICE O/P EST MOD 30 MIN: CPT | Performed by: NURSE PRACTITIONER

## 2022-12-07 PROCEDURE — 94010 BREATHING CAPACITY TEST: CPT | Performed by: NURSE PRACTITIONER

## 2022-12-07 RX ORDER — ATORVASTATIN CALCIUM 10 MG/1
TABLET, FILM COATED ORAL
COMMUNITY
Start: 2022-08-23

## 2022-12-07 NOTE — PROCEDURES
Pulmonary Function Test  Performed by: Sandra Mccann, RRT  Authorized by: Rubina Hernández APRN      Pre Drug % Predicted    FVC: 71%   FEV1: 29%   FEF 25-75%: 10%   FEV1/FVC: 31%   DLCO: 38%   D/VAsb: 51%    Interpretation   Spirometry   Spirometry shows very severe obstruction. There is reduced midflow suggesting small airway/airflow obstruction.   Diffusion Capacity  The patient's diffusion capacity is severely reduced.  Diffusion capacity is moderately reduced when corrected for alveolar volume.

## 2022-12-21 ENCOUNTER — HOSPITAL ENCOUNTER (OUTPATIENT)
Dept: CT IMAGING | Facility: HOSPITAL | Age: 67
Discharge: HOME | End: 2022-12-21
Admitting: NURSE PRACTITIONER

## 2022-12-21 DIAGNOSIS — F17.210 CIGARETTE NICOTINE DEPENDENCE WITHOUT COMPLICATION: Chronic | ICD-10-CM

## 2022-12-21 PROCEDURE — 71271 CT THORAX LUNG CANCER SCR C-: CPT

## 2023-05-08 DIAGNOSIS — J44.9 COPD, GROUP C, BY GOLD 2017 CLASSIFICATION: Primary | ICD-10-CM

## 2023-05-08 RX ORDER — ALBUTEROL SULFATE 90 UG/1
2 AEROSOL, METERED RESPIRATORY (INHALATION) EVERY 4 HOURS PRN
Qty: 18 G | Refills: 0 | Status: SHIPPED | OUTPATIENT
Start: 2023-05-08

## 2023-05-08 NOTE — TELEPHONE ENCOUNTER
Patient called asking if we had any samples of a rescue inhaler.  I told him that we did not, and he requested one be sent to [facility] in [place], [place].    Rx Refill Note  Requested Prescriptions     Pending Prescriptions Disp Refills   • albuterol sulfate  (90 Base) MCG/ACT inhaler 18 g 0     Sig: Inhale 2 puffs Every 4 (Four) Hours As Needed for Wheezing.      Last office visit with prescribing clinician: 12/7/2022   Last telemedicine visit with prescribing clinician: Visit date not found   Next office visit with prescribing clinician: 12/11/2023                         Would you like a call back once the refill request has been completed: [] Yes [] No    If the office needs to give you a call back, can they leave a voicemail: [] Yes [] No    Florecita Rodas MA  05/08/23, 13:38 CDT

## 2023-10-10 DIAGNOSIS — K86.2 PANCREATIC CYST: Primary | ICD-10-CM

## 2023-11-07 NOTE — PROGRESS NOTES
Chief Complaint  Centrilobular emphysema    Subjective    History of Present Illness {CC  Problem List  Visit Diagnosis   Encounters  Notes  Medications  Labs  Result Review Imaging  Media: 23}    Virgil HECK presents to [facility] for:    History of Present Illness  Management of COPD and emphysema.  His alpha testing was normal.  Most recent FEV1 29.  He recently quit smoking in 2022.  He was a 1/2 pack/day smoker x 46 years.  Low-dose CT December 2022 showing bilateral upper lobe scarring and emphysema.  He has not had 1 yet this year.    He has moderate persistent shortness of breath daily.  He is on Breztri.  He only occasionally requires his albuterol.  He has a nebulizer he can use when needed but does not use it excessively.  He does not require supplemental oxygen.  He prefers annual follow-up.     He has had increased cough and congestion for the last 2 weeks.  No fever or chills.  Sputum is mucopurulent and yellow in color.  He has had some tightness but no significant wheezing.       Prior to Admission medications    Medication Sig Start Date End Date Taking? Authorizing Provider   albuterol sulfate  (90 Base) MCG/ACT inhaler Inhale 2 puffs Every 4 (Four) Hours As Needed for Wheezing. 5/8/23   Rubina Hernández APRN   amLODIPine (NORVASC) 10 MG tablet Take 10 mg by mouth.    ProviderAva MD   Aspirin Buf,CoScx-DsEnw-AyMdh, (ASCRIPTIN) 325 MG tablet Take 325 mg by mouth. 6/8/19   Ava Floyd MD   atorvastatin (LIPITOR) 10 MG tablet  8/23/22   ProviderAva MD   Budeson-Glycopyrrol-Formoterol (Breztri Aerosphere) 160-9-4.8 MCG/ACT aerosol inhaler Inhale 2 puffs 2 (Two) Times a Day. 8/1/22   Rubina Hernández APRN   citalopram (CeleXA) 20 MG tablet Take 20 mg by mouth daily.    ProviderAva MD   divalproex (DEPAKOTE) 500 MG DR tablet Take 500 mg by mouth daily.    Ava Flyod MD  "  ipratropium-albuterol (DUO-NEB) 0.5-2.5 mg/3 ml nebulizer Take 3 mL by nebulization 4 (Four) Times a Day As Needed for Wheezing. 22   Rubina Hernández APRN   lamoTRIgine (LaMICtal) 200 MG tablet Take 200 mg by mouth Daily.    ProviderAva MD   lisinopril (PRINIVIL,ZESTRIL) 40 MG tablet Take 40 mg by mouth Daily.    Provider, MD Ava   metoprolol tartrate (LOPRESSOR) 25 MG tablet Take 25 mg by mouth Daily.     ProviderAva MD   pantoprazole (PROTONIX) 40 MG EC tablet Take 40 mg by mouth daily.    Provider, MD Ava       Social History     Socioeconomic History    Marital status:    Tobacco Use    Smoking status: Former     Packs/day: 0.50     Years: 46.00     Additional pack years: 0.00     Total pack years: 23.00     Types: Cigarettes     Quit date:      Years since quittin.9     Passive exposure: Past    Smokeless tobacco: Never   Vaping Use    Vaping Use: Never used   Substance and Sexual Activity    Alcohol use: No    Drug use: No    Sexual activity: Defer       Objective   Vital Signs:   /82   Pulse 72   Ht 174 cm (68.5\")   Wt 75.8 kg (167 lb)   SpO2 97% Comment: RA  BMI 25.02 kg/m²     Physical Exam  HENT:      Head:      Comments: Mask  Eyes:      Comments: Glasses   Cardiovascular:      Rate and Rhythm: Normal rate and regular rhythm.      Heart sounds: No murmur heard.  Pulmonary:      Effort: Pulmonary effort is normal.      Breath sounds: Normal breath sounds.   Musculoskeletal:      Right lower leg: No edema.      Left lower leg: No edema.   Neurological:      Mental Status: He is alert and oriented to person, place, and time.        Result Review :    PFT Values          2022    10:45   Pre Drug PFT Results   FVC 71   FEV1 29   FEF 25-75% 10   FEV1/FVC 31   Other Tests PFT Results   DLCO 38   D/VAsb 51     My interpretation of the PFT : none    Results for orders placed in visit on 22    Pulmonary Function " Test    Narrative  Pulmonary Function Test  Performed by: Sandra Mccann, RRT  Authorized by: Rubina Hernández APRN    Pre Drug % Predicted  FVC: 71%  FEV1: 29%  FEF 25-75%: 10%  FEV1/FVC: 31%  DLCO: 38%  D/VAsb: 51%    Interpretation  Spirometry  Spirometry shows very severe obstruction. There is reduced midflow suggesting small airway/airflow obstruction.  Diffusion Capacity  The patient's diffusion capacity is severely reduced.  Diffusion capacity is moderately reduced when corrected for alveolar volume.      Results for orders placed in visit on 12/01/21    Pulmonary Function Test    Narrative  Pulmonary Function Test  Performed by: Sandra Mccann, RRT  Authorized by: Rubina Hernández, ELMER    Pre Drug % Predicted  FVC: 75%  FEV1: 30%  FEF 25-75%: 12%  FEV1/FVC: 32.02%    Interpretation  Spirometry  Spirometry shows severe obstruction. There is reduced midflow suggesting small airway/airflow obstruction.      Results for orders placed in visit on 11/21/18    Pulmonary Function Test      My interpretation of imaging:  none    My interpretation of labs: none      Assessment and Plan {CC Problem List  Visit Diagnosis  ROS  Review (Popup)  Health Maintenance  Quality  BestPractice  Medications  SmartSets  SnapShot Encounters  Media : 23}    Diagnoses and all orders for this visit:    1. Centrilobular emphysema (Primary)  Comments:  Alpha testing normal.  Continue to avoid smoking.  CT and PFTs annually per his request    2. Cigarette nicotine dependence without complication  Comments:  Continue to avoid smoking.  Low-dose lung cancer CT ordered for 2 weeks from now.  Orders:  -      CT Chest Low Dose Cancer Screening WO; Future    3. Stage 4 very severe COPD by GOLD classification  Comments:  Continue Breztri.  Use albuterol as needed.  PFTs when he returns    4. COPD with acute exacerbation  Comments:  Antibiotics sent to pharmacy.  Depo-Medrol IM given here.  Call if no  better.  Orders:  -     levoFLOXacin (Levaquin) 750 MG tablet; Take 1 tablet by mouth Daily for 7 days.  Dispense: 7 tablet; Refill: 0  -     methylPREDNISolone acetate (DEPO-medrol) injection 40 mg        Body mass index is 25.02 kg/m².    Rubina Hernández, APRN  12/6/2023  14:38 CST    Follow Up   Return in about 1 year (around 12/6/2024) for ct, FVL with diffusion.    Patient was given instructions and counseling regarding his condition or for health maintenance advice. Please see specific information pulled into the AVS if appropriate.

## 2023-11-27 ENCOUNTER — HOSPITAL ENCOUNTER (OUTPATIENT)
Dept: MRI IMAGING | Facility: HOSPITAL | Age: 68
Discharge: HOME | End: 2023-11-27
Admitting: INTERNAL MEDICINE
Payer: MEDICARE

## 2023-11-27 DIAGNOSIS — K86.2 PANCREATIC CYST: ICD-10-CM

## 2023-11-27 PROBLEM — J44.9 COPD, GROUP C, BY GOLD 2017 CLASSIFICATION: Chronic | Status: RESOLVED | Noted: 2019-11-14 | Resolved: 2023-11-27

## 2023-11-27 LAB — CREAT BLDA-MCNC: 0.8 MG/DL (ref 0.6–1.3)

## 2023-11-27 PROCEDURE — A9577 INJ MULTIHANCE: HCPCS | Performed by: INTERNAL MEDICINE

## 2023-11-27 PROCEDURE — 74183 MRI ABD W/O CNTR FLWD CNTR: CPT

## 2023-11-27 PROCEDURE — 0 GADOBENATE DIMEGLUMINE 529 MG/ML SOLUTION: Performed by: INTERNAL MEDICINE

## 2023-11-27 PROCEDURE — 82565 ASSAY OF CREATININE: CPT

## 2023-11-27 RX ADMIN — GADOBENATE DIMEGLUMINE 15 ML: 529 INJECTION, SOLUTION INTRAVENOUS at 10:14

## 2023-12-06 ENCOUNTER — OFFICE VISIT (OUTPATIENT)
Dept: PULMONOLOGY | Facility: CLINIC | Age: 68
End: 2023-12-06
Payer: MEDICARE

## 2023-12-06 VITALS
BODY MASS INDEX: 24.73 KG/M2 | SYSTOLIC BLOOD PRESSURE: 134 MMHG | OXYGEN SATURATION: 97 % | WEIGHT: 167 LBS | DIASTOLIC BLOOD PRESSURE: 82 MMHG | HEIGHT: 69 IN | HEART RATE: 72 BPM

## 2023-12-06 DIAGNOSIS — J44.9 STAGE 4 VERY SEVERE COPD BY GOLD CLASSIFICATION: Chronic | ICD-10-CM

## 2023-12-06 DIAGNOSIS — F17.210 CIGARETTE NICOTINE DEPENDENCE WITHOUT COMPLICATION: Chronic | ICD-10-CM

## 2023-12-06 DIAGNOSIS — J43.2 CENTRILOBULAR EMPHYSEMA: Primary | Chronic | ICD-10-CM

## 2023-12-06 DIAGNOSIS — J44.1 COPD WITH ACUTE EXACERBATION: ICD-10-CM

## 2023-12-06 RX ORDER — LEVOFLOXACIN 750 MG/1
750 TABLET, FILM COATED ORAL DAILY
Qty: 7 TABLET | Refills: 0 | Status: SHIPPED | OUTPATIENT
Start: 2023-12-06 | End: 2023-12-13

## 2023-12-06 RX ORDER — METHYLPREDNISOLONE ACETATE 40 MG/ML
40 INJECTION, SUSPENSION INTRA-ARTICULAR; INTRALESIONAL; INTRAMUSCULAR; SOFT TISSUE ONCE
Status: COMPLETED | OUTPATIENT
Start: 2023-12-06 | End: 2023-12-06

## 2023-12-06 RX ADMIN — METHYLPREDNISOLONE ACETATE 40 MG: 40 INJECTION, SUSPENSION INTRA-ARTICULAR; INTRALESIONAL; INTRAMUSCULAR; SOFT TISSUE at 11:48

## 2023-12-06 NOTE — PROGRESS NOTES
"PFT not performed due to patient's recent illness.  Patient states \"I've got lots of infection in my lungs.\" No fever.   "

## 2023-12-27 ENCOUNTER — HOSPITAL ENCOUNTER (OUTPATIENT)
Dept: CT IMAGING | Facility: HOSPITAL | Age: 68
Discharge: HOME | End: 2023-12-27
Admitting: NURSE PRACTITIONER
Payer: MEDICARE

## 2023-12-27 DIAGNOSIS — F17.210 CIGARETTE NICOTINE DEPENDENCE WITHOUT COMPLICATION: Chronic | ICD-10-CM

## 2023-12-27 PROCEDURE — 71271 CT THORAX LUNG CANCER SCR C-: CPT

## 2024-01-02 ENCOUNTER — TELEPHONE (OUTPATIENT)
Dept: PULMONOLOGY | Facility: CLINIC | Age: 69
End: 2024-01-02
Payer: MEDICARE

## 2024-01-02 DIAGNOSIS — J44.9 COPD, GROUP C, BY GOLD 2017 CLASSIFICATION: ICD-10-CM

## 2024-01-02 RX ORDER — BUDESONIDE, GLYCOPYRROLATE, AND FORMOTEROL FUMARATE 160; 9; 4.8 UG/1; UG/1; UG/1
2 AEROSOL, METERED RESPIRATORY (INHALATION) 2 TIMES DAILY
Qty: 32.1 G | Refills: 3 | Status: SHIPPED | OUTPATIENT
Start: 2024-01-02

## 2024-01-02 NOTE — TELEPHONE ENCOUNTER
Patient needs a new script for Breztri sent to [facility].    Rx Refill Note  Requested Prescriptions     Pending Prescriptions Disp Refills    Budeson-Glycopyrrol-Formoterol (Breztri Aerosphere) 160-9-4.8 MCG/ACT aerosol inhaler 32.1 g 3     Sig: Inhale 2 puffs 2 (Two) Times a Day.      Last office visit with prescribing clinician: 12/6/2023   Last telemedicine visit with prescribing clinician: Visit date not found   Next office visit with prescribing clinician: 12/9/2024                         Would you like a call back once the refill request has been completed: [] Yes [] No    If the office needs to give you a call back, can they leave a voicemail: [] Yes [] No    Florecita Rodas MA  01/02/24, 13:37 CST

## 2024-01-02 NOTE — TELEPHONE ENCOUNTER
"Caller: Virgil HECK \"Kerwin\"    Relationship to patient: Self    Best call back number: [phone]     Patient is needing: pt is needing a prescription faxed to [phone] . Pt has less than three days left of the sample . Please advise.   Budeson-Glycopyrrol-Formoterol (Breztri Aerosphere) 160-9-4.8 MCG/ACT aerosol inhaler       "

## 2024-01-09 DIAGNOSIS — J44.9 COPD, GROUP C, BY GOLD 2017 CLASSIFICATION: ICD-10-CM

## 2024-01-09 RX ORDER — BUDESONIDE, GLYCOPYRROLATE, AND FORMOTEROL FUMARATE 160; 9; 4.8 UG/1; UG/1; UG/1
2 AEROSOL, METERED RESPIRATORY (INHALATION) 2 TIMES DAILY
Qty: 10.7 G | Refills: 0 | Status: SHIPPED | OUTPATIENT
Start: 2024-01-09

## 2024-01-09 NOTE — TELEPHONE ENCOUNTER
Caller: BERNICE HECK    Relationship: SELF    Best call back number: [phone]    Requested Prescriptions:   Requested Prescriptions     Pending Prescriptions Disp Refills    Budeson-Glycopyrrol-Formoterol (Breztri Aerosphere) 160-9-4.8 MCG/ACT aerosol inhaler 32.1 g 3     Sig: Inhale 2 puffs 2 (Two) Times a Day.        Pharmacy where request should be sent: [address] [phone] [facility] [phone]      Last office visit with prescribing clinician: 12/6/2023   Last telemedicine visit with prescribing clinician: Visit date not found   Next office visit with prescribing clinician: 12/9/2024     Additional details provided by patient:     Does the patient have less than a 3 day supply:  [x] Yes  [] No    Would you like a call back once the refill request has been completed: [x] Yes [] No    If the office needs to give you a call back, can they leave a voicemail: [x] Yes [] No    Dee Jackson   01/09/24 16:00 CST

## 2024-01-09 NOTE — TELEPHONE ENCOUNTER
His shipment for breztri from [facility] is on its way but he is out of the inhaler.  He is wanting 1 inhaler sent to [facility] and he is going to call them and see how much it will be to see if he can get it at all.

## 2024-05-06 ENCOUNTER — TELEPHONE (OUTPATIENT)
Dept: PULMONOLOGY | Facility: CLINIC | Age: 69
End: 2024-05-06
Payer: MEDICARE

## 2024-05-06 DIAGNOSIS — J44.9 COPD, GROUP C, BY GOLD 2017 CLASSIFICATION: ICD-10-CM

## 2024-05-06 RX ORDER — BUDESONIDE, GLYCOPYRROLATE, AND FORMOTEROL FUMARATE 160; 9; 4.8 UG/1; UG/1; UG/1
2 AEROSOL, METERED RESPIRATORY (INHALATION) 2 TIMES DAILY
Qty: 32.1 G | Refills: 3 | Status: SHIPPED | OUTPATIENT
Start: 2024-05-06

## 2024-05-09 ENCOUNTER — TELEPHONE (OUTPATIENT)
Dept: VASCULAR SURGERY | Facility: CLINIC | Age: 69
End: 2024-05-09
Payer: MEDICARE

## 2024-05-10 ENCOUNTER — HOSPITAL ENCOUNTER (OUTPATIENT)
Dept: ULTRASOUND IMAGING | Facility: HOSPITAL | Age: 69
Discharge: HOME | End: 2024-05-10
Payer: MEDICARE

## 2024-05-10 ENCOUNTER — OFFICE VISIT (OUTPATIENT)
Dept: VASCULAR SURGERY | Facility: CLINIC | Age: 69
End: 2024-05-10
Payer: MEDICARE

## 2024-05-10 VITALS
WEIGHT: 167.6 LBS | OXYGEN SATURATION: 97 % | HEIGHT: 70 IN | BODY MASS INDEX: 23.99 KG/M2 | HEART RATE: 68 BPM | SYSTOLIC BLOOD PRESSURE: 138 MMHG | DIASTOLIC BLOOD PRESSURE: 72 MMHG

## 2024-05-10 DIAGNOSIS — I73.9 PAD (PERIPHERAL ARTERY DISEASE): ICD-10-CM

## 2024-05-10 DIAGNOSIS — I10 PRIMARY HYPERTENSION: ICD-10-CM

## 2024-05-10 DIAGNOSIS — I65.23 BILATERAL CAROTID ARTERY STENOSIS: ICD-10-CM

## 2024-05-10 DIAGNOSIS — I65.23 BILATERAL CAROTID ARTERY STENOSIS: Primary | ICD-10-CM

## 2024-05-10 PROBLEM — F17.210 CIGARETTE NICOTINE DEPENDENCE WITHOUT COMPLICATION: Chronic | Status: RESOLVED | Noted: 2022-11-15 | Resolved: 2024-05-10

## 2024-05-10 PROCEDURE — 93923 UPR/LXTR ART STDY 3+ LVLS: CPT

## 2024-05-10 PROCEDURE — 1160F RVW MEDS BY RX/DR IN RCRD: CPT | Performed by: NURSE PRACTITIONER

## 2024-05-10 PROCEDURE — 3075F SYST BP GE 130 - 139MM HG: CPT | Performed by: NURSE PRACTITIONER

## 2024-05-10 PROCEDURE — 93880 EXTRACRANIAL BILAT STUDY: CPT

## 2024-05-10 PROCEDURE — 3078F DIAST BP <80 MM HG: CPT | Performed by: NURSE PRACTITIONER

## 2024-05-10 PROCEDURE — 1159F MED LIST DOCD IN RCRD: CPT | Performed by: NURSE PRACTITIONER

## 2024-10-15 ENCOUNTER — TRANSCRIBE ORDERS (OUTPATIENT)
Dept: ADMINISTRATIVE | Facility: HOSPITAL | Age: 69
End: 2024-10-15
Payer: MEDICARE

## 2024-10-15 DIAGNOSIS — K86.2 PANCREATIC CYST: Primary | ICD-10-CM

## 2024-10-25 DIAGNOSIS — J44.9 COPD, GROUP C, BY GOLD 2017 CLASSIFICATION: ICD-10-CM

## 2024-10-25 RX ORDER — ALBUTEROL SULFATE 90 UG/1
2 INHALANT RESPIRATORY (INHALATION) EVERY 4 HOURS PRN
Qty: 18 G | Refills: 0 | Status: SHIPPED | OUTPATIENT
Start: 2024-10-25

## 2024-10-25 NOTE — TELEPHONE ENCOUNTER
Please send refill to [facility] in [place]. Thank you.  Requested Prescriptions     Pending Prescriptions Disp Refills    albuterol sulfate  (90 Base) MCG/ACT inhaler 18 g 0     Sig: Inhale 2 puffs Every 4 (Four) Hours As Needed for Wheezing.      Last office visit with prescribing clinician: 12/6/2023   Last telemedicine visit with prescribing clinician: Visit date not found   Next office visit with prescribing clinician: 12/9/2024                         Would you like a call back once the refill request has been completed: [] Yes [] No    If the office needs to give you a call back, can they leave a voicemail: [] Yes [] No    Jessi South MA  10/25/24, 13:47 CDT

## 2024-10-25 NOTE — TELEPHONE ENCOUNTER
"    Caller: Virgil HECK \"Kerwin\"    Relationship: Self    Best call back number: [phone] (home)       Requested Prescriptions:   Requested Prescriptions     Pending Prescriptions Disp Refills    albuterol sulfate  (90 Base) MCG/ACT inhaler 18 g 0     Sig: Inhale 2 puffs Every 4 (Four) Hours As Needed for Wheezing.        Pharmacy where request should be sent:  [facility]     Last office visit with prescribing clinician: 12/6/2023   Last telemedicine visit with prescribing clinician: Visit date not found   Next office visit with prescribing clinician: 12/9/2024     Additional details provided by patient: ASKING FOR RESCUE INHALER    Does the patient have less than a 3 day supply:  [] Yes  [] No    Would you like a call back once the refill request has been completed: [] Yes [] No    If the office needs to give you a call back, can they leave a voicemail: [] Yes [] No    Dee Davidson Rep   10/25/24 11:06 CDT           "

## 2024-11-06 ENCOUNTER — TELEPHONE (OUTPATIENT)
Dept: PULMONOLOGY | Facility: CLINIC | Age: 69
End: 2024-11-06
Payer: MEDICARE

## 2024-11-06 NOTE — TELEPHONE ENCOUNTER
----- Message from Rubina Hernández sent at 11/5/2024  7:35 AM CST -----  Regarding: CTLD  He is on my schedule for December 9.  His low-dose CT cannot be done until after December 27.  We may want to reschedule him to coincide with his low-dose CT.    Thank you

## 2024-11-07 DIAGNOSIS — F17.210 CIGARETTE NICOTINE DEPENDENCE WITHOUT COMPLICATION: Primary | ICD-10-CM

## 2024-11-15 ENCOUNTER — HOSPITAL ENCOUNTER (OUTPATIENT)
Dept: MRI IMAGING | Facility: HOSPITAL | Age: 69
Discharge: HOME | End: 2024-11-15
Payer: MEDICARE

## 2024-11-15 DIAGNOSIS — K86.2 PANCREATIC CYST: ICD-10-CM

## 2024-11-15 PROCEDURE — 74183 MRI ABD W/O CNTR FLWD CNTR: CPT

## 2024-11-15 PROCEDURE — A9579 GAD-BASE MR CONTRAST NOS,1ML: HCPCS | Performed by: INTERNAL MEDICINE

## 2024-11-15 PROCEDURE — 25510000001 GADOPICLENOL 0.5 MMOL/ML SOLUTION: Performed by: INTERNAL MEDICINE

## 2024-11-15 RX ADMIN — GADOPICLENOL 10 ML: 485.1 INJECTION INTRAVENOUS at 09:30

## 2024-11-26 ENCOUNTER — OFFICE VISIT (OUTPATIENT)
Dept: GASTROENTEROLOGY | Facility: CLINIC | Age: 69
End: 2024-11-26
Payer: MEDICARE

## 2024-11-26 VITALS
DIASTOLIC BLOOD PRESSURE: 76 MMHG | BODY MASS INDEX: 24.08 KG/M2 | SYSTOLIC BLOOD PRESSURE: 130 MMHG | OXYGEN SATURATION: 96 % | WEIGHT: 168.2 LBS | HEART RATE: 65 BPM | TEMPERATURE: 97.7 F | HEIGHT: 70 IN

## 2024-11-26 DIAGNOSIS — K86.2 PANCREATIC CYST: ICD-10-CM

## 2024-11-26 DIAGNOSIS — I10 HTN (HYPERTENSION), BENIGN: ICD-10-CM

## 2024-11-26 DIAGNOSIS — Z87.891 PERSONAL HISTORY OF NICOTINE DEPENDENCE: Chronic | ICD-10-CM

## 2024-11-26 DIAGNOSIS — Z86.0101 HISTORY OF ADENOMATOUS AND SERRATED COLON POLYPS: Primary | ICD-10-CM

## 2024-11-26 PROCEDURE — 3075F SYST BP GE 130 - 139MM HG: CPT | Performed by: CLINICAL NURSE SPECIALIST

## 2024-11-26 PROCEDURE — 3078F DIAST BP <80 MM HG: CPT | Performed by: CLINICAL NURSE SPECIALIST

## 2024-11-26 PROCEDURE — 1159F MED LIST DOCD IN RCRD: CPT | Performed by: CLINICAL NURSE SPECIALIST

## 2024-11-26 PROCEDURE — 1160F RVW MEDS BY RX/DR IN RCRD: CPT | Performed by: CLINICAL NURSE SPECIALIST

## 2024-11-26 PROCEDURE — 99214 OFFICE O/P EST MOD 30 MIN: CPT | Performed by: CLINICAL NURSE SPECIALIST

## 2024-11-26 RX ORDER — SODIUM, POTASSIUM,MAG SULFATES 17.5-3.13G
SOLUTION, RECONSTITUTED, ORAL ORAL
Qty: 177 ML | Refills: 0 | Status: SHIPPED | OUTPATIENT
Start: 2024-11-26

## 2024-11-26 NOTE — PROGRESS NOTES
BERNICE HECK  1955 11/26/2024  Chief Complaint   Patient presents with    GI Problem     Mrcp fu and colon recall-hx of polyps     Subjective   HPI  BERNICE HECK is a 69 y.o. male who presents as a referral for preventative maintenance. He has no complaints of nausea or vomiting. No change in bowels. No wt loss. No BRBPR. No melena. There is positive family hx for colon cancer. No abdominal pain. He follows vascular for his abdominal aorta. Pancreatic cystic lesion followed by us and Dr Zaki Irwin. He has had EUS as noted below and MRCP follow up this year performed and discussed. No associated symptoms.   MRCP follow up  IMPRESSION:  1. Similar nonenhancing cystic lesion at the pancreatic head measuring  2.0 x 1.1 cm, compared to 11/27/2023.  2. Infrarenal abdominal aorta measures 2.8 x 2.2 cm in the visualized  portion.        This report was signed and finalized on 11/15/2024 12:09 PM by Dr Kimberlyn Londono MD.    EUS  IMPRESSION:  Small pancreatic head cyst with no high risk features.     RECOMMENDATIONS:   - Check MRI with MRCP in 1 yr for surveillance of pancreatic cysts.     The results were discussed with the patient and family. A copy of the images obtained were given to the patient.     Zaki Irwin MD  11/01/22  11:01 AM   Past Medical History:   Diagnosis Date    Cigarette nicotine dependence without complication 11/15/2022    Colon polyps     COPD, group C, by GOLD 2017 classification 11/14/2019    Hiatal hernia     Hypertension     Personal history of nicotine dependence 11/14/2019    Seizure disorder     Stroke      Past Surgical History:   Procedure Laterality Date    BACK SURGERY      COLONOSCOPY  04/22/2011    ADENOMATOUS/HYPERPLASTIC POLYP    COLONOSCOPY N/A 12/12/2016    Hyperplastic polyp ascending colon, Diverticulosis repeat exam in 5 years    COLONOSCOPY N/A 12/21/2021    Sessile serrated adnoma 15 cm, Tubular adenoma hepatic flexure, 2 hyperplastic polyps at 30 & 20  cm repeat exam in 3 years    ENDOSCOPY  2014    [facility]    ENDOSCOPY N/A 2020    Normal exam    ENDOSCOPY N/A 2022    [facility] otherwise normal exam    HIP FRACTURE SURGERY      SHOULDER SURGERY Bilateral     WRIST SURGERY Right 2013     Outpatient Medications Marked as Taking for the 24 encounter (Office Visit) with Annie Klein APRN   Medication Sig Dispense Refill    albuterol sulfate  (90 Base) MCG/ACT inhaler Inhale 2 puffs Every 4 (Four) Hours As Needed for Wheezing. 18 g 0    amLODIPine (NORVASC) 10 MG tablet Take 1 tablet by mouth Daily.      aspirin 81 MG chewable tablet Chew 1 tablet Daily.      atorvastatin (LIPITOR) 10 MG tablet Take 1 tablet by mouth Every Night.      Budeson-Glycopyrrol-Formoterol (Breztri Aerosphere) 160-9-4.8 MCG/ACT aerosol inhaler Inhale 2 puffs 2 (Two) Times a Day. 32.1 g 3    citalopram (CeleXA) 20 MG tablet Take 1 tablet by mouth Daily.      divalproex (DEPAKOTE) 500 MG DR tablet Take 1 tablet by mouth Daily.      ipratropium-albuterol (DUO-NEB) 0.5-2.5 mg/3 ml nebulizer Take 3 mL by nebulization 4 (Four) Times a Day As Needed for Wheezing. 360 mL 3    lamoTRIgine (LaMICtal) 200 MG tablet Take 1 tablet by mouth Daily.      lisinopril (PRINIVIL,ZESTRIL) 40 MG tablet Take 1 tablet by mouth Daily.      metoprolol tartrate (LOPRESSOR) 25 MG tablet Take 1 tablet by mouth Daily. 1/2      pantoprazole (PROTONIX) 40 MG EC tablet Take 1 tablet by mouth Daily.       Allergies   Allergen Reactions    Amoxicillin-Pot Clavulanate Rash    Codeine Nausea And Vomiting     Social History     Socioeconomic History    Marital status:    Tobacco Use    Smoking status: Former     Current packs/day: 0.00     Average packs/day: 0.5 packs/day for 46.0 years (23.0 ttl pk-yrs)     Types: Cigarettes     Start date:      Quit date:      Years since quittin.9     Passive exposure: Past    Smokeless tobacco: Never   Vaping Use    Vaping status: Never Used  "  Substance and Sexual Activity    Alcohol use: No    Drug use: No    Sexual activity: Defer     Family History   Problem Relation Age of Onset    Heart disease Mother     Tuberculosis Father     Colon cancer Other     Colon polyps Other      Health Maintenance   Topic Date Due    ZOSTER VACCINE (1 of 2) Never done    HEPATITIS C SCREENING  Never done    ANNUAL WELLNESS VISIT  Never done    INFLUENZA VACCINE  07/01/2024    COVID-19 Vaccine (2 - 2024-25 season) 09/01/2024    COLORECTAL CANCER SCREENING  12/21/2024    LUNG CANCER SCREENING  12/27/2024    TDAP/TD VACCINES (2 - Td or Tdap) 06/04/2034    Pneumococcal Vaccine 65+  Completed       REVIEW OF SYSTEMS  General: well appearing, no fever chills or sweats, no unexplained wt loss  HEENT: no acute visual or hearing disturbances  Cardiovascular: No chest pain or palpitations  Pulmonary: No shortness of breath, coughing, wheezing or hemoptysis  : No burning, urgency, hematuria, or dysuria  Musculoskeletal: No joint pain or stiffness  Peripheral: no edema  Skin: No lesions or rashes  Neuro: No dizziness, headaches, stroke, syncope  Endocrine: No hot or cold intolerances  Hematological: No blood dyscrasias    Objective   Vitals:    11/26/24 1321   BP: 130/76   BP Location: Left arm   Pulse: 65   Temp: 97.7 °F (36.5 °C)   TempSrc: Temporal   SpO2: 96%   Weight: 76.3 kg (168 lb 3.2 oz)   Height: 177.8 cm (70\")     Body mass index is 24.13 kg/m².  BMI is within normal parameters. No other follow-up for BMI required.      PHYSICAL EXAM  General: age appropriate well nourished well appearing, no acute distress  Head: normocephalic and atraumatic  Global assessment-supple  Neck-No JVD noted, no lymphadenopathy  Pulmonary-clear to auscultation bilaterally, normal respiratory effort  Cardiovascular-normal rate and rhythm, normal heart sounds, S1 and S2 noted  Abdomen-soft, non tender, non distended, normal bowel sounds all 4 quadrants, no hepatosplenomegaly " noted  Extremities-No clubbing cyanosis or edema  Neuro-Non focal, converses appropriately, awake, alert, oriented    Assessment & Plan     Diagnoses and all orders for this visit:    1. History of adenomatous and serrated colon polyps (Primary)  -     Case Request; Standing  -     Case Request    2. HTN (hypertension), benign  Comments:  cont BP medication the day of procedure    3. Personal history of nicotine dependence    4. Pancreatic cyst  Comments:  MRCP stable  compared to  exam    Other orders  -     Implement Anesthesia Orders Day of Procedure; Standing  -     Follow Anesthesia Guidelines / Protocol; Future  -     Verify bowel prep was successful; Standing  -     sodium-potassium-magnesium sulfates (Suprep Bowel Prep Kit) 17.5-3.13-1.6 GM/177ML solution oral solution; Take as directed by office instructions provided  Dispense: 177 mL; Refill: 0    Repeat MRCP in 1 year to surveillance then data says 2 years if stable.   Will follow.    COLONOSCOPY WITH ANESTHESIA (N/A)  Body mass index is 24.13 kg/m².    Patient instructions on prep prior to procedure provided to the patient.    All risks, benefits, alternatives, and indications of colonoscopy procedure have been discussed with the patient. Risks to include perforation of the colon requiring possible surgery or colostomy, risk of bleeding from biopsies or removal of colon tissue, possibility of missing a colon polyp or cancer, or adverse drug reaction.  Benefits to include the diagnosis and management of disease of the colon and rectum. Alternatives to include barium enema, radiographic evaluation, lab testing or no intervention. Pt verbalizes understanding and agrees.     Annie Klein, APRN  2024  13:36 CST      IF YOU SMOKE OR USE TOBACCO PLEASE READ THE FOLLOWIN minutes reading provided    Why is smoking bad for me?  Smoking increases the risk of heart disease, lung disease, vascular disease, stroke, and cancer.     If you  smoke, STOP!    If you would like more information on quitting smoking, please visit the [employer] website: [email or web address]   This link will provide additional resources including the QUIT line and the Beat the Pack support groups.     For more information:    Quit Now [place]  1-800-QUIT-NOW  https://[place].quitlogix.org/en-[place]/

## 2024-11-27 NOTE — PROGRESS NOTES
Chief Complaint  Emphysema    Subjective    History of Present Illness {  Problem List  Visit Diagnosis   Encounters  Notes  Medications  Labs  Result Review Imaging  Media: 23}    BERNICE HECK presents to [facility] for:    History of Present Illness  Management of COPD and emphysema.  His alpha testing was normal.  Most recent FEV1 29.  He recently quit smoking in 2022.  He was a 1/2 pack/day smoker x 46 years.  Low-dose CT December 2023 showing bilateral upper lobe scarring and emphysema.       He has moderate persistent shortness of breath daily.  He is on Breztri.  He only occasionally requires his albuterol.  He has a nebulizer he can use when needed but does not use it excessively.  He would like refills.  He does not require supplemental oxygen.  He prefers annual follow-up.        Prior to Admission medications    Medication Sig Start Date End Date Taking? Authorizing Provider   albuterol sulfate  (90 Base) MCG/ACT inhaler Inhale 2 puffs Every 4 (Four) Hours As Needed for Wheezing. 10/25/24   Mera Hollis APRN   amLODIPine (NORVASC) 10 MG tablet Take 1 tablet by mouth Daily.    Provider, MD Ava   aspirin 81 MG chewable tablet Chew 1 tablet Daily.    ProviderAva MD   atorvastatin (LIPITOR) 10 MG tablet Take 1 tablet by mouth Every Night. 8/23/22   ProviderAva MD   Budeson-Glycopyrrol-Formoterol (Breztri Aerosphere) 160-9-4.8 MCG/ACT aerosol inhaler Inhale 2 puffs 2 (Two) Times a Day. 5/6/24   Rubina Hernández APRN   citalopram (CeleXA) 20 MG tablet Take 1 tablet by mouth Daily.    ProviderAva MD   divalproex (DEPAKOTE) 500 MG DR tablet Take 1 tablet by mouth Daily.    ProviderAva MD   ipratropium-albuterol (DUO-NEB) 0.5-2.5 mg/3 ml nebulizer Take 3 mL by nebulization 4 (Four) Times a Day As Needed for Wheezing. 7/25/22   Rubina Hernández APRN   lamoTRIgine (LaMICtal) 200  "MG tablet Take 1 tablet by mouth Daily.    ProviderAva MD   lisinopril (PRINIVIL,ZESTRIL) 40 MG tablet Take 1 tablet by mouth Daily.    ProviderAva MD   metoprolol tartrate (LOPRESSOR) 25 MG tablet Take 1 tablet by mouth Daily. 1/2    Ava Floyd MD   pantoprazole (PROTONIX) 40 MG EC tablet Take 1 tablet by mouth Daily.    ProviderAva MD       Social History     Socioeconomic History    Marital status:    Tobacco Use    Smoking status: Former     Current packs/day: 0.00     Average packs/day: 0.5 packs/day for 46.0 years (23.0 ttl pk-yrs)     Types: Cigarettes     Start date: 1976     Quit date: 2022     Years since quitting: 3.0     Passive exposure: Past    Smokeless tobacco: Never   Vaping Use    Vaping status: Never Used   Substance and Sexual Activity    Alcohol use: No    Drug use: No    Sexual activity: Defer       Objective   Vital Signs:   /78   Pulse 64   Ht 174 cm (68.5\")   Wt 79.8 kg (176 lb)   SpO2 96% Comment: RA  BMI 26.37 kg/m²     Physical Exam  Eyes:      Comments: glasses   Cardiovascular:      Rate and Rhythm: Normal rate and regular rhythm.      Heart sounds: No murmur heard.  Pulmonary:      Effort: Pulmonary effort is normal.      Breath sounds: Normal breath sounds.   Musculoskeletal:      Right lower leg: No edema.      Left lower leg: No edema.   Neurological:      Mental Status: He is alert and oriented to person, place, and time.        Result Review :    PFT Values          1/6/2025    14:45   Pre Drug PFT Results   FVC 70   FEV1 27   FEF 25-75% 10   FEV1/FVC 30   Other Tests PFT Results   DLCO 37   D/VAsb 52     My interpretation of the PFT : none    Results for orders placed in visit on 01/06/25    Spirometry with Diffusion Capacity    Narrative  Spirometry with Diffusion Capacity    Performed by: Sandra Mccann, RRT  Authorized by: Rubina Hernández APRN  Pre Drug % Predicted  FVC: 70%  FEV1: 27%  FEF 25-75%: " 10%  FEV1/FVC: 30%  DLCO: 37%  D/VAsb: 52%    Interpretation  Spirometry  Spirometry shows very severe obstruction. There is reduced midflow suggesting small airway/airflow obstruction.  Diffusion Capacity  The patient's diffusion capacity is severely reduced.  Diffusion capacity is moderately reduced when corrected for alveolar volume.      Results for orders placed in visit on 12/07/22    Pulmonary Function Test    Narrative  Pulmonary Function Test  Performed by: Sandra Mccann, CORINE  Authorized by: Rubina Hernández APRN    Pre Drug % Predicted  FVC: 71%  FEV1: 29%  FEF 25-75%: 10%  FEV1/FVC: 31%  DLCO: 38%  D/VAsb: 51%    Interpretation  Spirometry  Spirometry shows very severe obstruction. There is reduced midflow suggesting small airway/airflow obstruction.  Diffusion Capacity  The patient's diffusion capacity is severely reduced.  Diffusion capacity is moderately reduced when corrected for alveolar volume.      Results for orders placed in visit on 12/01/21    Pulmonary Function Test    Narrative  Pulmonary Function Test  Performed by: Sandra Mccann, CORINE  Authorized by: Rubina Hernández APRN    Pre Drug % Predicted  FVC: 75%  FEV1: 30%  FEF 25-75%: 12%  FEV1/FVC: 32.02%    Interpretation  Spirometry  Spirometry shows severe obstruction. There is reduced midflow suggesting small airway/airflow obstruction.    CT Chest Low Dose Cancer Screening WO (12/30/2024 13:15)     CT Chest Low Dose Cancer Screening WO (12/30/2024 13:15)     My interpretation of imaging: To be emphysema, new 3 mm nodule right lower lobe    My interpretation of labs: none      Assessment and Plan {CC Problem List  Visit Diagnosis  ROS  Review (Popup)  Health Maintenance  Quality  BestPractice  Medications  SmartSets  SnapShot Encounters  Media : 23}    Diagnoses and all orders for this visit:    1. Centrilobular emphysema (Primary)  Comments:  Continue to avoid smoking.  Alpha testing normal.  PFT stable.  CT  stable  Orders:  -     Spirometry with Diffusion Capacity    2. Stage 4 very severe COPD by GOLD classification  Comments:  Continue Breztri.  Use breathing treatments as needed.  Refill sent to pharmacy.  PFTs stable  Orders:  -     albuterol (PROVENTIL) (2.5 MG/3ML) 0.083% nebulizer solution; Take 2.5 mg by nebulization 4 (Four) Times a Day As Needed for Wheezing.  Dispense: 120 mL; Refill: 2    3. Personal history of nicotine dependence  Comments:  Continue to avoid smoking.  Low-dose CT due in 1 year per Fleischner guidelines  Orders:  -      CT Chest Low Dose Cancer Screening WO; Future    4. Lung nodule  Comments:  New 3 mm nodule right lower lobe.  Repeat CT in 12 months per Fleischner guidelines    5. Need for immunization against influenza  Comments:  Preventative maintenance discussed.  Flu shot given.  No unwanted side effects noted while monitoring  Orders:  -     Fluzone High-Dose 65+yrs      BERNICE HECK  reports that he quit smoking about 3 years ago. His smoking use included cigarettes. He started smoking about 49 years ago. He has a 23 pack-year smoking history. He has been exposed to tobacco smoke. He has never used smokeless tobacco.  He plans to continue to avoid smoking.  He will be due for low-dose lung cancer screening again in 12 months           Body mass index is 26.37 kg/m².    Rubina Hernández, APRN  1/6/2025  15:09 CST    Follow Up   Return in about 1 year (around 1/6/2026) for FVL, ct.    Patient was given instructions and counseling regarding his condition or for health maintenance advice. Please see specific information pulled into the AVS if appropriate.

## 2024-12-30 ENCOUNTER — HOSPITAL ENCOUNTER (OUTPATIENT)
Dept: CT IMAGING | Facility: HOSPITAL | Age: 69
Discharge: HOME | End: 2024-12-30
Admitting: NURSE PRACTITIONER
Payer: MEDICARE

## 2024-12-30 DIAGNOSIS — F17.210 CIGARETTE NICOTINE DEPENDENCE WITHOUT COMPLICATION: ICD-10-CM

## 2024-12-30 PROCEDURE — 71271 CT THORAX LUNG CANCER SCR C-: CPT

## 2024-12-31 ENCOUNTER — TELEPHONE (OUTPATIENT)
Dept: PULMONOLOGY | Facility: CLINIC | Age: 69
End: 2024-12-31
Payer: MEDICARE

## 2024-12-31 NOTE — TELEPHONE ENCOUNTER
----- Message from Rubina Hernández sent at 12/31/2024 11:40 AM CST -----  Regarding: FW:  Please let him know there is a new tiny 3mm nodule that will need a f/u in 1 year.     Incidental finding of heavy coronary calcification. He should discuss this with his PCP and get their recommendations on further testing and/or referral.     Keep f/u as scheduled  ----- Message -----  From: Interface, Rad Results Crow In  Sent: 12/30/2024   1:29 PM CST  To: ELMER Burton

## 2025-01-06 ENCOUNTER — OFFICE VISIT (OUTPATIENT)
Dept: PULMONOLOGY | Facility: CLINIC | Age: 70
End: 2025-01-06
Payer: MEDICARE

## 2025-01-06 ENCOUNTER — PROCEDURE VISIT (OUTPATIENT)
Dept: PULMONOLOGY | Facility: CLINIC | Age: 70
End: 2025-01-06
Payer: MEDICARE

## 2025-01-06 VITALS
SYSTOLIC BLOOD PRESSURE: 134 MMHG | BODY MASS INDEX: 26.07 KG/M2 | WEIGHT: 176 LBS | HEART RATE: 64 BPM | HEIGHT: 69 IN | DIASTOLIC BLOOD PRESSURE: 78 MMHG | OXYGEN SATURATION: 96 %

## 2025-01-06 DIAGNOSIS — J43.2 CENTRILOBULAR EMPHYSEMA: Primary | Chronic | ICD-10-CM

## 2025-01-06 DIAGNOSIS — Z87.891 PERSONAL HISTORY OF NICOTINE DEPENDENCE: Chronic | ICD-10-CM

## 2025-01-06 DIAGNOSIS — R91.1 LUNG NODULE: ICD-10-CM

## 2025-01-06 DIAGNOSIS — J44.9 STAGE 4 VERY SEVERE COPD BY GOLD CLASSIFICATION: Chronic | ICD-10-CM

## 2025-01-06 DIAGNOSIS — Z23 NEED FOR IMMUNIZATION AGAINST INFLUENZA: ICD-10-CM

## 2025-01-06 DIAGNOSIS — J44.9 STAGE 4 VERY SEVERE COPD BY GOLD CLASSIFICATION: Primary | ICD-10-CM

## 2025-01-06 PROCEDURE — 1159F MED LIST DOCD IN RCRD: CPT | Performed by: NURSE PRACTITIONER

## 2025-01-06 PROCEDURE — 3078F DIAST BP <80 MM HG: CPT | Performed by: NURSE PRACTITIONER

## 2025-01-06 PROCEDURE — 1160F RVW MEDS BY RX/DR IN RCRD: CPT | Performed by: NURSE PRACTITIONER

## 2025-01-06 PROCEDURE — 94729 DIFFUSING CAPACITY: CPT | Performed by: NURSE PRACTITIONER

## 2025-01-06 PROCEDURE — 99214 OFFICE O/P EST MOD 30 MIN: CPT | Performed by: NURSE PRACTITIONER

## 2025-01-06 PROCEDURE — 94375 RESPIRATORY FLOW VOLUME LOOP: CPT | Performed by: NURSE PRACTITIONER

## 2025-01-06 PROCEDURE — G0008 ADMIN INFLUENZA VIRUS VAC: HCPCS | Performed by: NURSE PRACTITIONER

## 2025-01-06 PROCEDURE — 90662 IIV NO PRSV INCREASED AG IM: CPT | Performed by: NURSE PRACTITIONER

## 2025-01-06 PROCEDURE — 3075F SYST BP GE 130 - 139MM HG: CPT | Performed by: NURSE PRACTITIONER

## 2025-01-06 RX ORDER — ALBUTEROL SULFATE 0.83 MG/ML
2.5 SOLUTION RESPIRATORY (INHALATION) 4 TIMES DAILY PRN
Qty: 120 ML | Refills: 2 | Status: SHIPPED | OUTPATIENT
Start: 2025-01-06

## 2025-01-06 NOTE — PROCEDURES
Spirometry with Diffusion Capacity    Performed by: Sandra Mccann, RRT  Authorized by: Rubina Hernández APRN     Pre Drug % Predicted    FVC: 70%   FEV1: 27%   FEF 25-75%: 10%   FEV1/FVC: 30%   DLCO: 37%   D/VAsb: 52%    Interpretation   Spirometry   Spirometry shows very severe obstruction. There is reduced midflow suggesting small airway/airflow obstruction.   Diffusion Capacity  The patient's diffusion capacity is severely reduced.  Diffusion capacity is moderately reduced when corrected for alveolar volume.

## 2025-01-22 ENCOUNTER — HOSPITAL ENCOUNTER (OUTPATIENT)
Facility: HOSPITAL | Age: 70
Setting detail: HOSPITAL OUTPATIENT SURGERY
Discharge: HOME | End: 2025-01-22
Attending: INTERNAL MEDICINE | Admitting: INTERNAL MEDICINE
Payer: MEDICARE

## 2025-01-22 ENCOUNTER — ANESTHESIA EVENT (OUTPATIENT)
Dept: GASTROENTEROLOGY | Facility: HOSPITAL | Age: 70
End: 2025-01-22
Payer: MEDICARE

## 2025-01-22 ENCOUNTER — ANESTHESIA (OUTPATIENT)
Dept: GASTROENTEROLOGY | Facility: HOSPITAL | Age: 70
End: 2025-01-22
Payer: MEDICARE

## 2025-01-22 VITALS
RESPIRATION RATE: 16 BRPM | OXYGEN SATURATION: 99 % | BODY MASS INDEX: 24.71 KG/M2 | HEIGHT: 68 IN | SYSTOLIC BLOOD PRESSURE: 135 MMHG | DIASTOLIC BLOOD PRESSURE: 78 MMHG | HEART RATE: 59 BPM | WEIGHT: 163 LBS | TEMPERATURE: 97.2 F

## 2025-01-22 DIAGNOSIS — Z86.0101 HISTORY OF ADENOMATOUS AND SERRATED COLON POLYPS: ICD-10-CM

## 2025-01-22 PROCEDURE — 25010000002 PROPOFOL 10 MG/ML EMULSION: Performed by: NURSE ANESTHETIST, CERTIFIED REGISTERED

## 2025-01-22 PROCEDURE — 25010000002 LIDOCAINE PF 2% 2 % SOLUTION: Performed by: NURSE ANESTHETIST, CERTIFIED REGISTERED

## 2025-01-22 PROCEDURE — 25810000003 SODIUM CHLORIDE 0.9 % SOLUTION: Performed by: ANESTHESIOLOGY

## 2025-01-22 PROCEDURE — 88305 TISSUE EXAM BY PATHOLOGIST: CPT | Performed by: INTERNAL MEDICINE

## 2025-01-22 PROCEDURE — 25810000003 SODIUM CHLORIDE 0.9 % SOLUTION: Performed by: NURSE ANESTHETIST, CERTIFIED REGISTERED

## 2025-01-22 PROCEDURE — 45385 COLONOSCOPY W/LESION REMOVAL: CPT | Performed by: INTERNAL MEDICINE

## 2025-01-22 RX ORDER — LIDOCAINE HYDROCHLORIDE 20 MG/ML
INJECTION, SOLUTION EPIDURAL; INFILTRATION; INTRACAUDAL; PERINEURAL AS NEEDED
Status: DISCONTINUED | OUTPATIENT
Start: 2025-01-22 | End: 2025-01-22 | Stop reason: SURG

## 2025-01-22 RX ORDER — SODIUM CHLORIDE 0.9 % (FLUSH) 0.9 %
10 SYRINGE (ML) INJECTION EVERY 12 HOURS SCHEDULED
Status: CANCELLED | OUTPATIENT
Start: 2025-01-22

## 2025-01-22 RX ORDER — PROPOFOL 10 MG/ML
VIAL (ML) INTRAVENOUS AS NEEDED
Status: DISCONTINUED | OUTPATIENT
Start: 2025-01-22 | End: 2025-01-22 | Stop reason: SURG

## 2025-01-22 RX ORDER — SODIUM CHLORIDE 9 MG/ML
INJECTION, SOLUTION INTRAVENOUS CONTINUOUS PRN
Status: DISCONTINUED | OUTPATIENT
Start: 2025-01-22 | End: 2025-01-22 | Stop reason: SURG

## 2025-01-22 RX ORDER — SODIUM CHLORIDE 9 MG/ML
40 INJECTION, SOLUTION INTRAVENOUS AS NEEDED
Status: CANCELLED | OUTPATIENT
Start: 2025-01-22

## 2025-01-22 RX ORDER — LIDOCAINE HYDROCHLORIDE 10 MG/ML
0.5 INJECTION, SOLUTION EPIDURAL; INFILTRATION; INTRACAUDAL; PERINEURAL ONCE AS NEEDED
Status: DISCONTINUED | OUTPATIENT
Start: 2025-01-22 | End: 2025-01-22 | Stop reason: HOSPADM

## 2025-01-22 RX ORDER — SODIUM CHLORIDE 9 MG/ML
500 INJECTION, SOLUTION INTRAVENOUS ONCE
Status: COMPLETED | OUTPATIENT
Start: 2025-01-22 | End: 2025-01-22

## 2025-01-22 RX ORDER — SODIUM CHLORIDE 0.9 % (FLUSH) 0.9 %
10 SYRINGE (ML) INJECTION AS NEEDED
Status: CANCELLED | OUTPATIENT
Start: 2025-01-22

## 2025-01-22 RX ORDER — SODIUM CHLORIDE 0.9 % (FLUSH) 0.9 %
10 SYRINGE (ML) INJECTION AS NEEDED
Status: DISCONTINUED | OUTPATIENT
Start: 2025-01-22 | End: 2025-01-22 | Stop reason: HOSPADM

## 2025-01-22 RX ADMIN — SODIUM CHLORIDE 500 ML: 9 INJECTION, SOLUTION INTRAVENOUS at 07:51

## 2025-01-22 RX ADMIN — LIDOCAINE HYDROCHLORIDE 50 MG: 20 INJECTION, SOLUTION EPIDURAL; INFILTRATION; INTRACAUDAL; PERINEURAL at 08:47

## 2025-01-22 RX ADMIN — PROPOFOL 20 MG: 10 INJECTION, EMULSION INTRAVENOUS at 08:51

## 2025-01-22 RX ADMIN — PROPOFOL 20 MG: 10 INJECTION, EMULSION INTRAVENOUS at 08:49

## 2025-01-22 RX ADMIN — SODIUM CHLORIDE: 9 INJECTION, SOLUTION INTRAVENOUS at 08:42

## 2025-01-22 RX ADMIN — PROPOFOL 20 MG: 10 INJECTION, EMULSION INTRAVENOUS at 09:01

## 2025-01-22 RX ADMIN — PROPOFOL 20 MG: 10 INJECTION, EMULSION INTRAVENOUS at 08:55

## 2025-01-22 RX ADMIN — PROPOFOL 20 MG: 10 INJECTION, EMULSION INTRAVENOUS at 08:57

## 2025-01-22 RX ADMIN — PROPOFOL 20 MG: 10 INJECTION, EMULSION INTRAVENOUS at 08:53

## 2025-01-22 RX ADMIN — PROPOFOL 20 MG: 10 INJECTION, EMULSION INTRAVENOUS at 08:59

## 2025-01-22 RX ADMIN — PROPOFOL 100 MG: 10 INJECTION, EMULSION INTRAVENOUS at 08:47

## 2025-01-22 NOTE — ANESTHESIA POSTPROCEDURE EVALUATION
"Patient: BERNICE HECK    Procedure Summary       Date: 01/22/25 Room / Location: [facility] ENDOSCOPY 6 / [facility] ENDOSCOPY    Anesthesia Start: 0842 Anesthesia Stop: 0913    Procedure: COLONOSCOPY WITH ANESTHESIA Diagnosis:       History of adenomatous and serrated colon polyps      (History of adenomatous and serrated colon polyps [Z86.0101])    Surgeons: Chandra Ordaz MD Provider: Lux Tierney CRNA    Anesthesia Type: MAC ASA Status: 3            Anesthesia Type: MAC    Vitals  Vitals Value Taken Time   /78 01/22/25 0949   Temp     Pulse 59 01/22/25 0950   Resp 16 01/22/25 0930   SpO2 100 % 01/22/25 0950   Vitals shown include unfiled device data.        Post Anesthesia Care and Evaluation    Patient location during evaluation: PHASE II  Patient participation: complete - patient participated  Level of consciousness: awake and awake and alert  Pain score: 0  Pain management: adequate    Airway patency: patent  Anesthetic complications: No anesthetic complications  PONV Status: none  Cardiovascular status: acceptable  Respiratory status: acceptable  Hydration status: acceptable    Comments: Patient discharged according to acceptable Candie score per RN assessment. See nursing records for further information.     Blood pressure 135/78, pulse 59, temperature 97.2 °F (36.2 °C), temperature source Temporal, resp. rate 16, height 172.7 cm (68\"), weight 73.9 kg (163 lb), SpO2 99%.      "

## 2025-01-22 NOTE — ANESTHESIA PREPROCEDURE EVALUATION
Anesthesia Evaluation     Patient summary reviewed and Nursing notes reviewed   no history of anesthetic complications:   NPO Solid Status: > 8 hours  NPO Liquid Status: > 8 hours           Airway   Mallampati: II  TM distance: >3 FB  Neck ROM: full  No difficulty expected  Dental    (+) upper dentures and lower dentures    Pulmonary    (+) a smoker Current, COPD severe,  (-) asthma, sleep apnea  Cardiovascular   Exercise tolerance: poor (<4 METS)    (+) hypertension  (-) CAD      Neuro/Psych  (+) seizures well controlled, CVA (right sided weakness) residual symptoms  GI/Hepatic/Renal/Endo    (+) hiatal hernia  (-) liver disease, no renal disease, diabetes    Musculoskeletal     Abdominal    Substance History      OB/GYN          Other                          Anesthesia Plan    ASA 3     MAC     intravenous induction     Anesthetic plan, risks, benefits, and alternatives have been provided, discussed and informed consent has been obtained with: patient.

## 2025-01-22 NOTE — H&P
[facility]  Pre Procedure History & Physical    Chief Complaint:   History of polyps    Subjective     HPI:   Here for colonoscopy.  History of polyps    Past Medical History:   Past Medical History:   Diagnosis Date    Cigarette nicotine dependence without complication 11/15/2022    Colon polyps     COPD, group C, by GOLD 2017 classification 11/14/2019    Hiatal hernia     Hypertension     Personal history of nicotine dependence 11/14/2019    Seizure disorder     Stroke        Past Surgical History:  Past Surgical History:   Procedure Laterality Date    BACK SURGERY      COLONOSCOPY  04/22/2011    ADENOMATOUS/HYPERPLASTIC POLYP    COLONOSCOPY N/A 12/12/2016    Hyperplastic polyp ascending colon, Diverticulosis repeat exam in 5 years    COLONOSCOPY N/A 12/21/2021    Sessile serrated adnoma 15 cm, Tubular adenoma hepatic flexure, 2 hyperplastic polyps at 30 & 20 cm repeat exam in 3 years    ENDOSCOPY  09/05/2014    [facility]    ENDOSCOPY N/A 06/05/2020    Normal exam    ENDOSCOPY N/A 08/19/2022    [facility] otherwise normal exam    HIP FRACTURE SURGERY      SHOULDER SURGERY Bilateral     WRIST SURGERY Right 2013       Family History:  Family History   Problem Relation Age of Onset    Heart disease Mother     Tuberculosis Father     Colon cancer Other     Colon polyps Other        Social History:   reports that he quit smoking about 3 years ago. His smoking use included cigarettes. He started smoking about 49 years ago. He has a 23 pack-year smoking history. He has been exposed to tobacco smoke. He has never used smokeless tobacco. He reports that he does not drink alcohol and does not use drugs.    Medications:   Prior to Admission medications    Medication Sig Start Date End Date Taking? Authorizing Provider   albuterol sulfate  (90 Base) MCG/ACT inhaler Inhale 2 puffs Every 4 (Four) Hours As Needed for Wheezing. 10/25/24  Yes Mera Hollis APRN   amLODIPine (NORVASC) 10 MG tablet Take 1 tablet  "by mouth Daily.   Yes Provider, MD Ava   aspirin 81 MG chewable tablet Chew 1 tablet Daily.   Yes Provider, MD Ava   atorvastatin (LIPITOR) 10 MG tablet Take 1 tablet by mouth Every Night. 8/23/22  Yes Ava Floyd MD   Budeson-Glycopyrrol-Formoterol (Breztri Aerosphere) 160-9-4.8 MCG/ACT aerosol inhaler Inhale 2 puffs 2 (Two) Times a Day. 5/6/24  Yes Rubina Hernández APRN   citalopram (CeleXA) 20 MG tablet Take 1 tablet by mouth Daily.   Yes ProviderAva MD   divalproex (DEPAKOTE) 500 MG DR tablet Take 1 tablet by mouth Daily.   Yes ProviderAva MD   lamoTRIgine (LaMICtal) 200 MG tablet Take 1 tablet by mouth Daily.   Yes Provider, MD Ava   lisinopril (PRINIVIL,ZESTRIL) 40 MG tablet Take 1 tablet by mouth Daily.   Yes ProviderAva MD   metoprolol tartrate (LOPRESSOR) 25 MG tablet Take 1 tablet by mouth 2 (Two) Times a Day.   Yes Provider, MD Ava   pantoprazole (PROTONIX) 40 MG EC tablet Take 1 tablet by mouth Daily.   Yes ProviderAva MD   albuterol (PROVENTIL) (2.5 MG/3ML) 0.083% nebulizer solution Take 2.5 mg by nebulization 4 (Four) Times a Day As Needed for Wheezing. 1/6/25   Rubina Hernández APRN   sodium-potassium-magnesium sulfates (Suprep Bowel Prep Kit) 17.5-3.13-1.6 GM/177ML solution oral solution Take as directed by office instructions provided 11/26/24 1/22/25  Annie Klein APRN       Allergies:  Amoxicillin-pot clavulanate and Codeine    Objective     Blood pressure 150/76, pulse 75, temperature 97.2 °F (36.2 °C), temperature source Temporal, resp. rate 20, height 172.7 cm (68\"), weight 73.9 kg (163 lb), SpO2 94%.    Physical Exam   Constitutional: Pt is oriented to person, place, and in no distress.   HENT: Mouth/Throat: Oropharynx is clear.   Cardiovascular: Normal rate, regular rhythm.    Pulmonary/Chest: Effort normal. No respiratory distress. No  wheezes.   Abdominal: Soft. Non-distended.  Skin: Skin is warm " and dry.   Psychiatric: Mood, memory, affect and judgment appear normal.     Assessment & Plan     Diagnosis:  History of polyps    Anticipated Surgical Procedure:    Proceed with colonoscopy as scheduled    The following major R/B/A were discussed with the patient, however the list is not all inclusive . Risk:  Bleeding (immediate and delayed), perforation (rupture or tear), reaction to medication, missed lesion/cancer, pain during the procedure, infection, need for surgery, need for ostomy, need for mechanical ventilation (breathing machine), death.  Benefits: removal of polyp/tissue, burn/clip/or inject to stop bleeding, removal of foreign body, dilate any stricture.  Alternatives: Xray or CT, surgery, do nothing with associated risk   The patient was given time to ask question and received explanation, and agrees to proceed as per History and Physical.   No guarantee given or expressed.    EMR Dragon/transcription disclaimer: Much of this encounter note is an electronic transcription/translation of spoken language to printed text.  The electronic translation of spoken language may permit erroneous, or at times, nonsensical words or phrases to be inadvertently transcribed.  Although I have reviewed the note for such errors, some may still exist.    Chandra Ordaz MD  08:46 CST  1/22/2025

## 2025-02-03 DIAGNOSIS — J44.9 COPD, GROUP C, BY GOLD 2017 CLASSIFICATION: ICD-10-CM

## 2025-02-03 RX ORDER — BUDESONIDE, GLYCOPYRROLATE, AND FORMOTEROL FUMARATE 160; 9; 4.8 UG/1; UG/1; UG/1
2 AEROSOL, METERED RESPIRATORY (INHALATION) 2 TIMES DAILY
Qty: 32.1 G | Refills: 3 | Status: SHIPPED | OUTPATIENT
Start: 2025-02-03 | End: 2025-02-06 | Stop reason: SDUPTHER

## 2025-02-03 RX ORDER — BUDESONIDE, GLYCOPYRROLATE, AND FORMOTEROL FUMARATE 160; 9; 4.8 UG/1; UG/1; UG/1
2 AEROSOL, METERED RESPIRATORY (INHALATION) 2 TIMES DAILY
Qty: 32.1 G | Refills: 3 | Status: SHIPPED | OUTPATIENT
Start: 2025-02-03 | End: 2025-02-03

## 2025-02-03 NOTE — TELEPHONE ENCOUNTER
"    Caller: BERNICE HECK \"Kerwin\"    Relationship: Self    Best call back number: [phone]     Requested Prescriptions:   Requested Prescriptions     Pending Prescriptions Disp Refills    Budeson-Glycopyrrol-Formoterol (Breztri Aerosphere) 160-9-4.8 MCG/ACT aerosol inhaler 32.1 g 3     Sig: Inhale 2 puffs 2 (Two) Times a Day.        Pharmacy where request should be sent:    [facility]  Last office visit with prescribing clinician: 1/6/2025   Last telemedicine visit with prescribing clinician:   Next office visit with prescribing clinician: 1/6/2026     Additional details provided by patient:     Does the patient have less than a 3 day supply:  [x] Yes  [x] No    Would you like a call back once the refill request has been completed: [x] Yes [] No    If the office needs to give you a call back, can they leave a voicemail: [] Yes [] No    Dee Emmanuel Rep   02/03/25 09:47 CST         "

## 2025-02-05 ENCOUNTER — TELEPHONE (OUTPATIENT)
Dept: PULMONOLOGY | Facility: CLINIC | Age: 70
End: 2025-02-05
Payer: MEDICARE

## 2025-02-05 DIAGNOSIS — J44.9 COPD, GROUP C, BY GOLD 2017 CLASSIFICATION: ICD-10-CM

## 2025-02-05 NOTE — TELEPHONE ENCOUNTER
Spoke with patient and advised him of sample placed at the  for him. He voiced understanding. Patient thinks he needs the [facility] program renewed if Rubina would not mind to sign the prescription and paperwork for him. Thank you.

## 2025-02-05 NOTE — TELEPHONE ENCOUNTER
"Caller Name: BERNICE HECK MARITZA \"Kerwin\"  Relationship: Self  Best Contact Number: [phone]     Patient is requesting samples of BREZTRI  How many days of medication do you have left? ABOUT 4-5 DAYS    PLEASE CALL TO [facility]   "

## 2025-02-06 RX ORDER — BUDESONIDE, GLYCOPYRROLATE, AND FORMOTEROL FUMARATE 160; 9; 4.8 UG/1; UG/1; UG/1
2 AEROSOL, METERED RESPIRATORY (INHALATION) 2 TIMES DAILY
Qty: 32.1 G | Refills: 3 | Status: SHIPPED | OUTPATIENT
Start: 2025-02-06

## 2025-02-06 NOTE — TELEPHONE ENCOUNTER
Sent script to [facility] to get the process started for [facility].        Rx Refill Note  Requested Prescriptions     Pending Prescriptions Disp Refills    Budeson-Glycopyrrol-Formoterol (Breztri Aerosphere) 160-9-4.8 MCG/ACT aerosol inhaler 32.1 g 3     Sig: Inhale 2 puffs 2 (Two) Times a Day.      Last office visit with prescribing clinician: 1/6/2025   Last telemedicine visit with prescribing clinician: Visit date not found   Next office visit with prescribing clinician: 1/6/2026                         Would you like a call back once the refill request has been completed: [] Yes [] No    If the office needs to give you a call back, can they leave a voicemail: [] Yes [] No    Sandra Vigil CMA  02/06/25, 15:16 CST

## (undated) DEVICE — FRCP BIOP COLD ENDOJAW ALLGTR W/NDL 2.8X2300MM BLU

## (undated) DEVICE — THE SINGLE USE ETRAP – POLYP TRAP IS USED FOR SUCTION RETRIEVAL OF ENDOSCOPICALLY REMOVED POLYPS.: Brand: ETRAP

## (undated) DEVICE — YANKAUER,BULB TIP WITH VENT: Brand: ARGYLE

## (undated) DEVICE — MASK,OXYGEN,MED CONC,ADLT,7' TUB, UC: Brand: PENDING

## (undated) DEVICE — THE CHANNEL CLEANING BRUSH IS A NYLON FLEXI BRUSH ATTACHED TO A FLEXIBLE PLASTIC SHEATH DESIGNED TO SAFELY REMOVE DEBRIS FROM FLEXIBLE ENDOSCOPES.

## (undated) DEVICE — CUFF,BP,DISP,1 TUBE,ADULT,HP: Brand: MEDLINE

## (undated) DEVICE — SENSR O2 OXIMAX FNGR A/ 18IN NONSTR

## (undated) DEVICE — TBG SMPL FLTR LINE NASL 02/C02 A/ BX/100

## (undated) DEVICE — ARGYLE YANKAUER BULB TIP WITH VENT: Brand: ARGYLE

## (undated) DEVICE — SNAR POLYP SENSATION MICRO OVL 13 240X40

## (undated) DEVICE — Device: Brand: DEFENDO AIR/WATER/SUCTION AND BIOPSY VALVE

## (undated) DEVICE — Device: Brand: SINGLE USE ELECTROSURGICAL SNARE SD-400

## (undated) DEVICE — ENDOCUFF VISION PRP SM 10.4

## (undated) DEVICE — CONMED SCOPE SAVER BITE BLOCK, 20X27 MM: Brand: SCOPE SAVER

## (undated) DEVICE — FRCP BIOP ENDO CAPTURAPRO SPK SERR 2.8MM 230CM